# Patient Record
Sex: FEMALE | Race: OTHER | NOT HISPANIC OR LATINO | URBAN - METROPOLITAN AREA
[De-identification: names, ages, dates, MRNs, and addresses within clinical notes are randomized per-mention and may not be internally consistent; named-entity substitution may affect disease eponyms.]

---

## 2019-02-10 ENCOUNTER — INPATIENT (INPATIENT)
Facility: HOSPITAL | Age: 54
LOS: 1 days | Discharge: HOME | End: 2019-02-12
Attending: INTERNAL MEDICINE | Admitting: INTERNAL MEDICINE
Payer: MEDICAID

## 2019-02-10 VITALS
TEMPERATURE: 98 F | RESPIRATION RATE: 20 BRPM | DIASTOLIC BLOOD PRESSURE: 98 MMHG | HEART RATE: 96 BPM | SYSTOLIC BLOOD PRESSURE: 165 MMHG | OXYGEN SATURATION: 100 %

## 2019-02-10 DIAGNOSIS — Z29.9 ENCOUNTER FOR PROPHYLACTIC MEASURES, UNSPECIFIED: ICD-10-CM

## 2019-02-10 DIAGNOSIS — R07.9 CHEST PAIN, UNSPECIFIED: ICD-10-CM

## 2019-02-10 DIAGNOSIS — E78.5 HYPERLIPIDEMIA, UNSPECIFIED: ICD-10-CM

## 2019-02-10 DIAGNOSIS — R55 SYNCOPE AND COLLAPSE: ICD-10-CM

## 2019-02-10 DIAGNOSIS — I10 ESSENTIAL (PRIMARY) HYPERTENSION: ICD-10-CM

## 2019-02-10 LAB
ALBUMIN SERPL ELPH-MCNC: 4.8 G/DL — SIGNIFICANT CHANGE UP (ref 3.3–5)
ALP SERPL-CCNC: 66 U/L — SIGNIFICANT CHANGE UP (ref 40–120)
ALT FLD-CCNC: 31 U/L — SIGNIFICANT CHANGE UP (ref 4–33)
ANION GAP SERPL CALC-SCNC: 14 MMO/L — SIGNIFICANT CHANGE UP (ref 7–14)
APTT BLD: 32.3 SEC — SIGNIFICANT CHANGE UP (ref 27.5–36.3)
AST SERPL-CCNC: 24 U/L — SIGNIFICANT CHANGE UP (ref 4–32)
BASOPHILS # BLD AUTO: 0.02 K/UL — SIGNIFICANT CHANGE UP (ref 0–0.2)
BASOPHILS NFR BLD AUTO: 0.2 % — SIGNIFICANT CHANGE UP (ref 0–2)
BILIRUB SERPL-MCNC: 1 MG/DL — SIGNIFICANT CHANGE UP (ref 0.2–1.2)
BUN SERPL-MCNC: 14 MG/DL — SIGNIFICANT CHANGE UP (ref 7–23)
CALCIUM SERPL-MCNC: 9.7 MG/DL — SIGNIFICANT CHANGE UP (ref 8.4–10.5)
CHLORIDE SERPL-SCNC: 100 MMOL/L — SIGNIFICANT CHANGE UP (ref 98–107)
CHOLEST SERPL-MCNC: 272 MG/DL — HIGH (ref 120–199)
CK MB BLD-MCNC: 1.42 NG/ML — SIGNIFICANT CHANGE UP (ref 1–4.7)
CK MB BLD-MCNC: SIGNIFICANT CHANGE UP (ref 0–2.5)
CK SERPL-CCNC: 72 U/L — SIGNIFICANT CHANGE UP (ref 25–170)
CO2 SERPL-SCNC: 25 MMOL/L — SIGNIFICANT CHANGE UP (ref 22–31)
CREAT SERPL-MCNC: 0.84 MG/DL — SIGNIFICANT CHANGE UP (ref 0.5–1.3)
D DIMER BLD IA.RAPID-MCNC: < 150 NG/ML — SIGNIFICANT CHANGE UP
EOSINOPHIL # BLD AUTO: 0.15 K/UL — SIGNIFICANT CHANGE UP (ref 0–0.5)
EOSINOPHIL NFR BLD AUTO: 1.3 % — SIGNIFICANT CHANGE UP (ref 0–6)
GLUCOSE SERPL-MCNC: 108 MG/DL — HIGH (ref 70–99)
HCT VFR BLD CALC: 48.5 % — HIGH (ref 34.5–45)
HDLC SERPL-MCNC: 62 MG/DL — SIGNIFICANT CHANGE UP (ref 45–65)
HGB BLD-MCNC: 15.1 G/DL — SIGNIFICANT CHANGE UP (ref 11.5–15.5)
IMM GRANULOCYTES NFR BLD AUTO: 0.3 % — SIGNIFICANT CHANGE UP (ref 0–1.5)
INR BLD: 0.94 — SIGNIFICANT CHANGE UP (ref 0.88–1.17)
LIPID PNL WITH DIRECT LDL SERPL: 203 MG/DL — SIGNIFICANT CHANGE UP
LYMPHOCYTES # BLD AUTO: 1.02 K/UL — SIGNIFICANT CHANGE UP (ref 1–3.3)
LYMPHOCYTES # BLD AUTO: 9 % — LOW (ref 13–44)
MCHC RBC-ENTMCNC: 25.7 PG — LOW (ref 27–34)
MCHC RBC-ENTMCNC: 31.1 % — LOW (ref 32–36)
MCV RBC AUTO: 82.6 FL — SIGNIFICANT CHANGE UP (ref 80–100)
MONOCYTES # BLD AUTO: 0.71 K/UL — SIGNIFICANT CHANGE UP (ref 0–0.9)
MONOCYTES NFR BLD AUTO: 6.3 % — SIGNIFICANT CHANGE UP (ref 2–14)
NEUTROPHILS # BLD AUTO: 9.41 K/UL — HIGH (ref 1.8–7.4)
NEUTROPHILS NFR BLD AUTO: 82.9 % — HIGH (ref 43–77)
NRBC # FLD: 0 K/UL — LOW (ref 25–125)
NT-PROBNP SERPL-SCNC: 5 PG/ML — SIGNIFICANT CHANGE UP
PLATELET # BLD AUTO: 308 K/UL — SIGNIFICANT CHANGE UP (ref 150–400)
PMV BLD: 9.5 FL — SIGNIFICANT CHANGE UP (ref 7–13)
POTASSIUM SERPL-MCNC: 4.1 MMOL/L — SIGNIFICANT CHANGE UP (ref 3.5–5.3)
POTASSIUM SERPL-SCNC: 4.1 MMOL/L — SIGNIFICANT CHANGE UP (ref 3.5–5.3)
PROT SERPL-MCNC: 7.8 G/DL — SIGNIFICANT CHANGE UP (ref 6–8.3)
PROTHROM AB SERPL-ACNC: 10.7 SEC — SIGNIFICANT CHANGE UP (ref 9.8–13.1)
RBC # BLD: 5.87 M/UL — HIGH (ref 3.8–5.2)
RBC # FLD: 13.7 % — SIGNIFICANT CHANGE UP (ref 10.3–14.5)
SODIUM SERPL-SCNC: 139 MMOL/L — SIGNIFICANT CHANGE UP (ref 135–145)
TRIGL SERPL-MCNC: 248 MG/DL — HIGH (ref 10–149)
TROPONIN T, HIGH SENSITIVITY: < 6 NG/L — SIGNIFICANT CHANGE UP (ref ?–14)
TROPONIN T, HIGH SENSITIVITY: < 6 NG/L — SIGNIFICANT CHANGE UP (ref ?–14)
WBC # BLD: 11.34 K/UL — HIGH (ref 3.8–10.5)
WBC # FLD AUTO: 11.34 K/UL — HIGH (ref 3.8–10.5)

## 2019-02-10 PROCEDURE — 71046 X-RAY EXAM CHEST 2 VIEWS: CPT | Mod: 26

## 2019-02-10 RX ORDER — ATORVASTATIN CALCIUM 80 MG/1
40 TABLET, FILM COATED ORAL AT BEDTIME
Qty: 0 | Refills: 0 | Status: DISCONTINUED | OUTPATIENT
Start: 2019-02-10 | End: 2019-02-12

## 2019-02-10 RX ORDER — LOSARTAN POTASSIUM 100 MG/1
50 TABLET, FILM COATED ORAL DAILY
Qty: 0 | Refills: 0 | Status: DISCONTINUED | OUTPATIENT
Start: 2019-02-10 | End: 2019-02-12

## 2019-02-10 RX ORDER — IRBESARTAN 75 MG/1
1 TABLET ORAL
Qty: 0 | Refills: 0 | COMMUNITY

## 2019-02-10 RX ORDER — SODIUM CHLORIDE 9 MG/ML
3 INJECTION INTRAMUSCULAR; INTRAVENOUS; SUBCUTANEOUS EVERY 8 HOURS
Qty: 0 | Refills: 0 | Status: DISCONTINUED | OUTPATIENT
Start: 2019-02-10 | End: 2019-02-12

## 2019-02-10 RX ORDER — LOSARTAN POTASSIUM 100 MG/1
50 TABLET, FILM COATED ORAL DAILY
Qty: 0 | Refills: 0 | Status: DISCONTINUED | OUTPATIENT
Start: 2019-02-10 | End: 2019-02-10

## 2019-02-10 RX ORDER — ASPIRIN/CALCIUM CARB/MAGNESIUM 324 MG
1 TABLET ORAL
Qty: 0 | Refills: 0 | COMMUNITY

## 2019-02-10 RX ORDER — ASPIRIN/CALCIUM CARB/MAGNESIUM 324 MG
81 TABLET ORAL DAILY
Qty: 0 | Refills: 0 | Status: DISCONTINUED | OUTPATIENT
Start: 2019-02-11 | End: 2019-02-12

## 2019-02-10 RX ORDER — HEPARIN SODIUM 5000 [USP'U]/ML
5000 INJECTION INTRAVENOUS; SUBCUTANEOUS EVERY 12 HOURS
Qty: 0 | Refills: 0 | Status: DISCONTINUED | OUTPATIENT
Start: 2019-02-10 | End: 2019-02-12

## 2019-02-10 RX ORDER — NITROGLYCERIN 6.5 MG
0.4 CAPSULE, EXTENDED RELEASE ORAL ONCE
Qty: 0 | Refills: 0 | Status: COMPLETED | OUTPATIENT
Start: 2019-02-10 | End: 2019-02-10

## 2019-02-10 RX ADMIN — Medication 0.4 MILLIGRAM(S): at 12:01

## 2019-02-10 RX ADMIN — ATORVASTATIN CALCIUM 40 MILLIGRAM(S): 80 TABLET, FILM COATED ORAL at 22:56

## 2019-02-10 RX ADMIN — SODIUM CHLORIDE 3 MILLILITER(S): 9 INJECTION INTRAMUSCULAR; INTRAVENOUS; SUBCUTANEOUS at 21:31

## 2019-02-10 NOTE — H&P ADULT - NSHPLABSRESULTS_GEN_ALL_CORE
EKG: NSR 93 Flat T in V1, II, AVF , AVL, TWI in V6\                            15.1   11.34 )-----------( 308      ( 10 Feb 2019 11:30 )             48.5     02-10    139  |  100  |  14  ----------------------------<  108<H>  4.1   |  25  |  0.84    Ca    9.7      10 Feb 2019 11:30    TPro  7.8  /  Alb  4.8  /  TBili  1.0  /  DBili  x   /  AST  24  /  ALT  31  /  AlkPhos  66  02-10    Troponin T, High Sensitivity: < 6: ---------------------***PLEASE NOTE***----------------------  Rapid changes upward or downward in high-sensitivity  troponin levels strongly suggest acute myocardial injury.  Hemolysis may falsely lower results. Renal impairment may  increase results.    Normal: <6 - 14 ng/L  Indeterminate: 15 - 51 ng/L  Elevated: >51 ng/L    Please see "[email or web address] on the [employer]  intranet for more information. ng/L (02.10.19 @ 11:30)    < from: Xray Chest 2 Views PA/Lat (02.10.19 @ 12:31) >    Clear lungs.     < end of copied text >

## 2019-02-10 NOTE — ED PROVIDER NOTE - PROGRESS NOTE
blue foam today)  Exercise 5: Gait with rolling walker CGA x 1 with another following in a w/c. ~200 to 250 feet x1. Pt needing cues to keep head up during gait  and to relax shoulders with fair compliance. Activity Tolerance:  Activity Tolerance: Patient Tolerated treatment well    Assessment: Body structures, Functions, Activity limitations: Decreased functional mobility , Decreased ADL status, Decreased ROM, Decreased strength, Decreased safe awareness, Decreased endurance, Decreased balance  Assessment: Pt tolerated treatment session well. Shortened treatment session d/t double booking of patients. Pt amb ~200 to 250 feet x1 with rolling walker with CGA of one person and +1 following with w/c. Prognosis: Fair  Discharge Recommendations: Continue to assess pending progress    Patient Education:  Patient Education: Issued home ex program for UE strengthening with red theraband in seated position. Patient to continue also with seated LE strengthening ex as issued previous session. Plan:  Times per week: 2x per week  Plan weeks: 6 weeks  Specific instructions for Next Treatment: Nustep, LE strengthening seated position LAQS, leg curls with theraband, sit to stand transitions. Balance in // bars and progress in UE/  LE strengthening. Gait training with roller walker with +2 assist.    Current Treatment Recommendations: Strengthening, ROM, Balance Training, Functional Mobility Training, Transfer Training, Home Exercise Program, Gait Training, Endurance Training, Patient/Caregiver Education & Training    Goals:  Patient goals : To be able to walk and stand better. To be less wobbly and improve strength. Short term goals  Time Frame for Short term goals: 3 weeks   Short term goal 1: Patient to demonstrate increased LE strength at hip and knee m to 4/5 with ability to do chair sit to stand test 3x ini 15 seconds with use of UEs on armrests of chair.    Short term goal 2: Patient to demonstrate
Stable.

## 2019-02-10 NOTE — ED PROVIDER NOTE - MEDICAL DECISION MAKING DETAILS
Pt is a 54 y/o F w/ a PMHx of HTN and HLD who p/w left chest pressure and squeezing Pt is a 54 y/o F w/ a PMHx of HTN and HLD who p/w left chest pressure and squeezing concerning for possible unstable angina vs stable angina, low suspicion for PE and dissection and PTX given histroy and physical. Will get CBC, CMP, PT/INR, aPTT, D-Dimmer, trop, proBNP, CXR, give nitro, patient already took ASA, will likely need admission as HEART Score 4 and at moderate risk. Pt is a 54 y/o F w/ a PMHx of HTN and HLD who p/w left chest pressure and squeezing concerning for possible unstable angina vs stable angina, low suspicion for PE and dissection and PTX given histroy and physical. Will get CBC, CMP, PT/INR, aPTT, D-Dimmer, trop, proBNP, CXR, give nitro, patient already took ASA, will likely need CDU due to HEART score of 3.

## 2019-02-10 NOTE — H&P ADULT - ATTENDING COMMENTS
Pt seen and examined at bedside ; agree with above     EKG: SR non specific St-T changes     Echo pending     Assessment and Plan     1) Chest pain on exertion: EKG with non specific changes , HST- , will get echo / Stress in the am     2) Dyslipidemia : states had myopathy with statins in the past LDL>190, monitor on lipitor 40     3) DVT PPX hepqrin sub q

## 2019-02-10 NOTE — ED ADULT NURSE NOTE - CHIEF COMPLAINT QUOTE
states" I am having chest pain with difficulty in breathing since few days getting worst." patient returned from [facility] on feb 4th ." pain gets worst when lying down and going in to back.

## 2019-02-10 NOTE — CONSULT NOTE ADULT - SUBJECTIVE AND OBJECTIVE BOX
TRACI HERNANDEZ  53y  Female      Patient is a 53y old  Female who presents with a chief complaint of chest pain      HPI:  Patient c/o chest pain on and off since last year,substernal,sometimes radiating to lt.arm,used to take asa.Pain used to go away.This tiime chest pain dis not disappear,so decided to come to ER.no n/v,sob,diaphoresis,cough or fever.  chest pain lasted for about 101-5 min.no cp at present  INTERVAL HPI/OVERNIGHT EVENTS:  Medications:MEDICATIONS  (STANDING):  heparin  Injectable 5000 Unit(s) SubCutaneous every 12 hours  losartan 50 milliGRAM(s) Oral daily  sodium chloride 0.9% lock flush 3 milliLiter(s) IV Push every 8 hours  asa  MEDICATIONS  (PRN):      Allergies: Allergies    aspirin (Hives)    Intolerances        REVIEW OF SYSTEMS:  CONSTITUTIONAL: No fever, weight loss, or fatigue  EYES: No eye pain, visual disturbances, or discharge  ENMT:  No difficulty hearing, tinnitus, vertigo; No sinus or throat pain  NECK: No pain or stiffness  RESPIRATORY: No cough, wheezing, chills or hemoptysis; No shortness of breath  CARDIOVASCULAR: No chest pain, palpitations, dizziness, or leg swelling  GASTROINTESTINAL: No abdominal or epigastric pain. No nausea, vomiting, or hematemesis; No diarrhea or constipation. No melena or hematochezia.  GENITOURINARY: No dysuria, frequency, hematuria, or incontinence  NEUROLOGICAL: No headaches, memory loss, loss of strength, numbness, or tremors  SKIN: No itching, burning, rashes, or lesions   ENDOCRINE: No heat or cold intolerance; No hair loss  MUSCULOSKELETAL: No joint pain or swelling; No muscle, back, or extremity pain  PSYCHIATRIC: No depression, anxiety, mood swings, or difficulty sleeping  HEME/LYMPH: No easy bruising, or bleeding gums      T(C): 37 (02-10-19 @ 19:33), Max: 37 (02-10-19 @ 19:33)  HR: 76 (02-10-19 @ 19:33) (76 - 96)  BP: 164/95 (02-10-19 @ 19:33) (161/98 - 174/92)  RR: 17 (02-10-19 @ 19:33) (15 - 20)  SpO2: 100% (02-10-19 @ 19:33) (100% - 100%)  Wt(kg): --Vital Signs Last 24 Hrs  T(C): 37 (10 Feb 2019 19:33), Max: 37 (10 Feb 2019 19:33)  T(F): 98.6 (10 Feb 2019 19:33), Max: 98.6 (10 Feb 2019 19:33)  HR: 76 (10 Feb 2019 19:33) (76 - 96)  BP: 164/95 (10 Feb 2019 19:33) (161/98 - 174/92)  BP(mean): --  RR: 17 (10 Feb 2019 19:33) (15 - 20)  SpO2: 100% (10 Feb 2019 19:33) (100% - 100%)  I&O's Summary      PHYSICAL EXAM:  GENERAL: NAD, well-groomed, well-developed  HEAD:  Atraumatic, Normocephalic  EYES: EOMI, PERRLA, conjunctiva and sclera clear  ENMT: No tonsillar erythema, exudates, or enlargement; Moist mucous membranes, Good dentition, No lesions  NECK: Supple, No JVD, Normal thyroid  NERVOUS SYSTEM:  Alert & Oriented X3, Good concentration; Motor Strength 5/5 B/L upper and lower extremities; DTRs 2+ intact and symmetric  CHEST/LUNG: Clear to percussion bilaterally; No rales, rhonchi, wheezing, or rubs  HEART: Regular rate and rhythm; No murmurs, rubs, or gallops  ABDOMEN: Soft, Nontender, Nondistended; Bowel sounds present  EXTREMITIES:  2+ Peripheral Pulses, No clubbing, cyanosis, or edema  LYMPH: No lymphadenopathy noted  SKIN: No rashes or lesions    Consultant(s) Notes Reviewed:  [x ] YES  [ ] NO  Care Discussed with Consultants/Other Providers [ x] YES  [ ] NO    LABS:                        15.1   11.34 )-----------( 308      ( 10 Feb 2019 11:30 )             48.5     02-10    139  |  100  |  14  ----------------------------<  108<H>  4.1   |  25  |  0.84    Ca    9.7      10 Feb 2019 11:30    TPro  7.8  /  Alb  4.8  /  TBili  1.0  /  DBili  x   /  AST  24  /  ALT  31  /  AlkPhos  66  02-10    PT/INR - ( 10 Feb 2019 11:30 )   PT: 10.7 SEC;   INR: 0.94          PTT - ( 10 Feb 2019 11:30 )  PTT:32.3 SEC    CAPILLARY BLOOD GLUCOSE                RADIOLOGY & ADDITIONAL TESTS:    Imaging Personally Reviewed:  [ ] YES  [ ] NO

## 2019-02-10 NOTE — ED ADULT TRIAGE NOTE - CHIEF COMPLAINT QUOTE
states" I am having chest pain with difficulty in breathing since few days getting worst." patient returned from [facility] on feb 4th ." pain gets worst when lying down and going in to back.

## 2019-02-10 NOTE — ED ADULT NURSE NOTE - OBJECTIVE STATEMENT
52 y/o female presents to ED with multiple medical complaints.  Pt states that she has had chest discomfort for 6 months to a year, pt describes pain as sharp and it extends to her back, pt denies SOB.  Pt states that she travelled from [facility] on 2/4/19 and states that her chest discomfort became acutely worse after her flight.  Pt states that she had an episode of dizziness yesterday.  Pt also states that she has episodes of numbness to her hands that causes her to drop dishes in the sink, pt states that the numbness also occurs in her left leg.  Additionally pt states that she has not slept in over a year because her mouth is dry at night and she needs to drink water.  Pt states that she has not sought medical attention for her symptoms.  Pt states that she has hx of HTN and high cholesterol.  Pt states that she is compliant with her HTN meds but ran out of her cholesterol medication greater than 1 year ago.  IV established labs drawn and sent, pt placed on CM>NSR 90s.  Providers evaluating,  remains at bedside.

## 2019-02-10 NOTE — H&P ADULT - PROBLEM SELECTOR PLAN 1
Admit to tele  check cbc, bmp, a1c, flp, tsh, trend CE  echo ordered   consider ischemic eval  cont asa Admit to tele  check cbc, bmp, a1c, flp, tsh, trend CE  echo ordered   consider ischemic eval  cont asa  Discussed with Dr. Rivera

## 2019-02-10 NOTE — CONSULT NOTE ADULT - ASSESSMENT
-Assessment/Rec  -chest pain-r/o ACS-cont.asa,ECHO,Cardiology consult  -HTN-losartan  -no f/h of cad  -no h/o smoking  -check TSH,lipid profile  -DVT Prophylaxis  -Thanks,will f/u -Assessment/Rec  -chest pain-r/o ACS-cont.asa,ECHO,Cardiology consult  -HTN-losartan  -HLD-star lipitor  -no f/h of cad  -no h/o smoking  -check TSH,lipid profile,hgba1c  -DVT Prophylaxis  -Thanks,will f/u

## 2019-02-10 NOTE — ED PROVIDER NOTE - CADM POA CENTRAL LINE
pt is resting in bed on cardiac monitor. Daughter at bed side. pt denies any pain or discomfort at this time. VS stable. pt and her daughter aware of the plan of care and has no questions or concerns at this time. Safety maintained. Will continue to monitor. No

## 2019-02-10 NOTE — ED PROVIDER NOTE - OBJECTIVE STATEMENT
Pt is a 54 y/o F w/ a PMHx of who p/w chest pain. Pt states that      ROS: Denies fevers, chills, Abdominal pain, rhinorrhea, new rashes, new LE edema, new visual changes, confusion, headaches, blood in stools, N/V, dysuria, and hematuria. Pt is a 52 y/o F w/ a PMHx of HTN and HLD who p/w left chest pressure and squeezing. Pt states that has been on going for the past year and is intermittent associated with SOB, +lying flat makes chest pressure, +presyncope yesterday on exertion, +chest pressure and palpitations on exertion. +flight from [facility] (6hours) recently, no H/o DVT, +FMHx of young death in parents in 30s (pt does not know why), no new MAKSIM, +nausea (Extremely nauseas this morning), no diaphoresis. Took 162 ASA this morning.      ROS: Denies fevers, chills, Abdominal pain, rhinorrhea, new rashes, new visual changes, confusion, headaches, blood in stools, dysuria, and hematuria.    BP med =irbasartan 150mg

## 2019-02-10 NOTE — ED PROVIDER NOTE - ATTENDING CONTRIBUTION TO CARE
nohemi: hx from pt. she does not routinely seek medical care.   hx HTN and elevated cholesterol. Ran out of cholesterol meds 2 months ago. has 6-8 month hx of two different chest pains. one occurring on exertion and relieved at rest; a second which occurs spont at nighttime and lasts until she goes to sleep and not present on awakening.   no known hx CAD. pt has not been to provider for discomfort.   sh: FH CV disease and pt is from [facility].    exam: unremarkable  impression: pt will multiple CV risk factors who does not routinely go for medical care presents with anginal type sx. Pt will be admitted for inpt w/u.

## 2019-02-10 NOTE — ED PROVIDER NOTE - PROGRESS NOTE DETAILS
Spoke to Tele Doc of Day and will admit for R/O ACs. Text paged Tele PA. Rpt EKG in progress.   -Abbe Singletary PGY4 [facility]#[id number]

## 2019-02-10 NOTE — H&P ADULT - HISTORY OF PRESENT ILLNESS
52 y/o [place] F with hx of HTN, HLD presents with chest pain since summer. Patient states she has been having L sided chest pain which radiates to shoulder then b/l arms which lasts for 15-20 minutes. She states she noted the pain both while exerting herself and while resting. Patient has been having intermittent pain over the last few months and she takes aspirin which relieves the pain slightly. She also noted she had three episodes of lightheadedness when she had the same chest pain. She does not have the lightheaded episode all the time while she gets the chest pain, only three times. She states she does not like following up with doctors which was why she never came for the check up. She recently traveled to [facility]. After she returned she had similar pain which was associated with lightheadedness. She tried aspirin, but pain was resolving which was why she came for evaluation. She also notes difficulty lying flat on bed secondary to chest tightness. has chronic B/l LE edema which has not worsen. Also endorses BROCK after 5-10 minutes which has been going for "a while." Denies fever, chills, cough, falls, LOC, nausea, vomiting, melena, hematochezia, dysuria, or calf tenderness.

## 2019-02-11 LAB
ANION GAP SERPL CALC-SCNC: 13 MMO/L — SIGNIFICANT CHANGE UP (ref 7–14)
BASOPHILS # BLD AUTO: 0.03 K/UL — SIGNIFICANT CHANGE UP (ref 0–0.2)
BASOPHILS NFR BLD AUTO: 0.4 % — SIGNIFICANT CHANGE UP (ref 0–2)
BUN SERPL-MCNC: 13 MG/DL — SIGNIFICANT CHANGE UP (ref 7–23)
CALCIUM SERPL-MCNC: 9.2 MG/DL — SIGNIFICANT CHANGE UP (ref 8.4–10.5)
CHLORIDE SERPL-SCNC: 102 MMOL/L — SIGNIFICANT CHANGE UP (ref 98–107)
CHOLEST SERPL-MCNC: 226 MG/DL — HIGH (ref 120–199)
CO2 SERPL-SCNC: 24 MMOL/L — SIGNIFICANT CHANGE UP (ref 22–31)
CREAT SERPL-MCNC: 0.77 MG/DL — SIGNIFICANT CHANGE UP (ref 0.5–1.3)
EOSINOPHIL # BLD AUTO: 0.17 K/UL — SIGNIFICANT CHANGE UP (ref 0–0.5)
EOSINOPHIL NFR BLD AUTO: 2.2 % — SIGNIFICANT CHANGE UP (ref 0–6)
GLUCOSE SERPL-MCNC: 101 MG/DL — HIGH (ref 70–99)
HBA1C BLD-MCNC: 5.7 % — HIGH (ref 4–5.6)
HCG SERPL-ACNC: < 5 MIU/ML — SIGNIFICANT CHANGE UP
HCT VFR BLD CALC: 46.6 % — HIGH (ref 34.5–45)
HCV AB S/CO SERPL IA: 0.17 S/CO — SIGNIFICANT CHANGE UP
HCV AB SERPL-IMP: SIGNIFICANT CHANGE UP
HDLC SERPL-MCNC: 54 MG/DL — SIGNIFICANT CHANGE UP (ref 45–65)
HGB BLD-MCNC: 14.4 G/DL — SIGNIFICANT CHANGE UP (ref 11.5–15.5)
IMM GRANULOCYTES NFR BLD AUTO: 0.4 % — SIGNIFICANT CHANGE UP (ref 0–1.5)
LIPID PNL WITH DIRECT LDL SERPL: 175 MG/DL — SIGNIFICANT CHANGE UP
LYMPHOCYTES # BLD AUTO: 1.15 K/UL — SIGNIFICANT CHANGE UP (ref 1–3.3)
LYMPHOCYTES # BLD AUTO: 14.7 % — SIGNIFICANT CHANGE UP (ref 13–44)
MAGNESIUM SERPL-MCNC: 2 MG/DL — SIGNIFICANT CHANGE UP (ref 1.6–2.6)
MCHC RBC-ENTMCNC: 25.5 PG — LOW (ref 27–34)
MCHC RBC-ENTMCNC: 30.9 % — LOW (ref 32–36)
MCV RBC AUTO: 82.5 FL — SIGNIFICANT CHANGE UP (ref 80–100)
MONOCYTES # BLD AUTO: 1 K/UL — HIGH (ref 0–0.9)
MONOCYTES NFR BLD AUTO: 12.8 % — SIGNIFICANT CHANGE UP (ref 2–14)
NEUTROPHILS # BLD AUTO: 5.46 K/UL — SIGNIFICANT CHANGE UP (ref 1.8–7.4)
NEUTROPHILS NFR BLD AUTO: 69.5 % — SIGNIFICANT CHANGE UP (ref 43–77)
NRBC # FLD: 0 K/UL — LOW (ref 25–125)
PHOSPHATE SERPL-MCNC: 3.6 MG/DL — SIGNIFICANT CHANGE UP (ref 2.5–4.5)
PLATELET # BLD AUTO: 277 K/UL — SIGNIFICANT CHANGE UP (ref 150–400)
PMV BLD: 9.8 FL — SIGNIFICANT CHANGE UP (ref 7–13)
POTASSIUM SERPL-MCNC: 4 MMOL/L — SIGNIFICANT CHANGE UP (ref 3.5–5.3)
POTASSIUM SERPL-SCNC: 4 MMOL/L — SIGNIFICANT CHANGE UP (ref 3.5–5.3)
RBC # BLD: 5.65 M/UL — HIGH (ref 3.8–5.2)
RBC # FLD: 14.1 % — SIGNIFICANT CHANGE UP (ref 10.3–14.5)
SODIUM SERPL-SCNC: 139 MMOL/L — SIGNIFICANT CHANGE UP (ref 135–145)
TRIGL SERPL-MCNC: 145 MG/DL — SIGNIFICANT CHANGE UP (ref 10–149)
TSH SERPL-MCNC: 3.45 UIU/ML — SIGNIFICANT CHANGE UP (ref 0.27–4.2)
WBC # BLD: 7.84 K/UL — SIGNIFICANT CHANGE UP (ref 3.8–10.5)
WBC # FLD AUTO: 7.84 K/UL — SIGNIFICANT CHANGE UP (ref 3.8–10.5)

## 2019-02-11 PROCEDURE — 78452 HT MUSCLE IMAGE SPECT MULT: CPT | Mod: 26

## 2019-02-11 PROCEDURE — 93018 CV STRESS TEST I&R ONLY: CPT | Mod: GC

## 2019-02-11 PROCEDURE — 93016 CV STRESS TEST SUPVJ ONLY: CPT | Mod: GC

## 2019-02-11 PROCEDURE — 93306 TTE W/DOPPLER COMPLETE: CPT | Mod: 26

## 2019-02-11 RX ORDER — ACETAMINOPHEN 500 MG
650 TABLET ORAL ONCE
Qty: 0 | Refills: 0 | Status: COMPLETED | OUTPATIENT
Start: 2019-02-11 | End: 2019-02-11

## 2019-02-11 RX ADMIN — SODIUM CHLORIDE 3 MILLILITER(S): 9 INJECTION INTRAMUSCULAR; INTRAVENOUS; SUBCUTANEOUS at 13:21

## 2019-02-11 RX ADMIN — Medication 650 MILLIGRAM(S): at 13:17

## 2019-02-11 RX ADMIN — Medication 81 MILLIGRAM(S): at 13:19

## 2019-02-11 RX ADMIN — LOSARTAN POTASSIUM 50 MILLIGRAM(S): 100 TABLET, FILM COATED ORAL at 05:01

## 2019-02-11 RX ADMIN — SODIUM CHLORIDE 3 MILLILITER(S): 9 INJECTION INTRAMUSCULAR; INTRAVENOUS; SUBCUTANEOUS at 21:32

## 2019-02-11 RX ADMIN — SODIUM CHLORIDE 3 MILLILITER(S): 9 INJECTION INTRAMUSCULAR; INTRAVENOUS; SUBCUTANEOUS at 05:01

## 2019-02-11 RX ADMIN — Medication 650 MILLIGRAM(S): at 14:10

## 2019-02-11 RX ADMIN — ATORVASTATIN CALCIUM 40 MILLIGRAM(S): 80 TABLET, FILM COATED ORAL at 21:35

## 2019-02-11 NOTE — CHART NOTE - NSCHARTNOTEFT_GEN_A_CORE
Called Dr. Rivera with results of stress test:      Abnormal Study  * Myocardial Perfusion SPECT results are abnormal.  * Review of raw data shows: The study is of good technical  quality.  * The left ventricle was normal in size. There are medium  sized, mild defects in inferior and inferoseptal,  inferolateral walls that are reversible.  * The observed defect(s) partially correct(s) with prone  imaging.  * Post-stress gated wall motion analysis was performed  (LVEF > 70%;LVEDV = 37 ml.), revealing normal LV function.  RV function appeared normal.  * NOTE: Patient experienced chest pain at peak exercise.  Chest pain subsided upon getting off treadmill.    Advised likely Cath on 2/12 or 2/13 schedule permitting. Will keep NPO p MN.

## 2019-02-12 ENCOUNTER — TRANSCRIPTION ENCOUNTER (OUTPATIENT)
Age: 54
End: 2019-02-12

## 2019-02-12 VITALS
TEMPERATURE: 98 F | DIASTOLIC BLOOD PRESSURE: 84 MMHG | OXYGEN SATURATION: 98 % | RESPIRATION RATE: 18 BRPM | HEART RATE: 95 BPM | SYSTOLIC BLOOD PRESSURE: 130 MMHG

## 2019-02-12 LAB
ANION GAP SERPL CALC-SCNC: 14 MMO/L — SIGNIFICANT CHANGE UP (ref 7–14)
BASOPHILS # BLD AUTO: 0.01 K/UL — SIGNIFICANT CHANGE UP (ref 0–0.2)
BASOPHILS NFR BLD AUTO: 0.2 % — SIGNIFICANT CHANGE UP (ref 0–2)
BUN SERPL-MCNC: 11 MG/DL — SIGNIFICANT CHANGE UP (ref 7–23)
CALCIUM SERPL-MCNC: 9.4 MG/DL — SIGNIFICANT CHANGE UP (ref 8.4–10.5)
CHLORIDE SERPL-SCNC: 103 MMOL/L — SIGNIFICANT CHANGE UP (ref 98–107)
CO2 SERPL-SCNC: 25 MMOL/L — SIGNIFICANT CHANGE UP (ref 22–31)
CREAT SERPL-MCNC: 0.84 MG/DL — SIGNIFICANT CHANGE UP (ref 0.5–1.3)
EOSINOPHIL # BLD AUTO: 0.13 K/UL — SIGNIFICANT CHANGE UP (ref 0–0.5)
EOSINOPHIL NFR BLD AUTO: 2.6 % — SIGNIFICANT CHANGE UP (ref 0–6)
GLUCOSE SERPL-MCNC: 98 MG/DL — SIGNIFICANT CHANGE UP (ref 70–99)
HCT VFR BLD CALC: 49 % — HIGH (ref 34.5–45)
HGB BLD-MCNC: 15.2 G/DL — SIGNIFICANT CHANGE UP (ref 11.5–15.5)
IMM GRANULOCYTES NFR BLD AUTO: 0.4 % — SIGNIFICANT CHANGE UP (ref 0–1.5)
LYMPHOCYTES # BLD AUTO: 1.07 K/UL — SIGNIFICANT CHANGE UP (ref 1–3.3)
LYMPHOCYTES # BLD AUTO: 21.5 % — SIGNIFICANT CHANGE UP (ref 13–44)
MCHC RBC-ENTMCNC: 25.6 PG — LOW (ref 27–34)
MCHC RBC-ENTMCNC: 31 % — LOW (ref 32–36)
MCV RBC AUTO: 82.6 FL — SIGNIFICANT CHANGE UP (ref 80–100)
MONOCYTES # BLD AUTO: 0.83 K/UL — SIGNIFICANT CHANGE UP (ref 0–0.9)
MONOCYTES NFR BLD AUTO: 16.7 % — HIGH (ref 2–14)
NEUTROPHILS # BLD AUTO: 2.92 K/UL — SIGNIFICANT CHANGE UP (ref 1.8–7.4)
NEUTROPHILS NFR BLD AUTO: 58.6 % — SIGNIFICANT CHANGE UP (ref 43–77)
NRBC # FLD: 0 K/UL — LOW (ref 25–125)
PLATELET # BLD AUTO: 283 K/UL — SIGNIFICANT CHANGE UP (ref 150–400)
PMV BLD: 9.9 FL — SIGNIFICANT CHANGE UP (ref 7–13)
POTASSIUM SERPL-MCNC: 3.7 MMOL/L — SIGNIFICANT CHANGE UP (ref 3.5–5.3)
POTASSIUM SERPL-SCNC: 3.7 MMOL/L — SIGNIFICANT CHANGE UP (ref 3.5–5.3)
RBC # BLD: 5.93 M/UL — HIGH (ref 3.8–5.2)
RBC # FLD: 14 % — SIGNIFICANT CHANGE UP (ref 10.3–14.5)
SODIUM SERPL-SCNC: 142 MMOL/L — SIGNIFICANT CHANGE UP (ref 135–145)
WBC # BLD: 4.98 K/UL — SIGNIFICANT CHANGE UP (ref 3.8–10.5)
WBC # FLD AUTO: 4.98 K/UL — SIGNIFICANT CHANGE UP (ref 3.8–10.5)

## 2019-02-12 RX ORDER — ACETAMINOPHEN 500 MG
650 TABLET ORAL ONCE
Qty: 0 | Refills: 0 | Status: COMPLETED | OUTPATIENT
Start: 2019-02-12 | End: 2019-02-12

## 2019-02-12 RX ORDER — ATORVASTATIN CALCIUM 80 MG/1
1 TABLET, FILM COATED ORAL
Qty: 30 | Refills: 0 | OUTPATIENT
Start: 2019-02-12 | End: 2019-03-13

## 2019-02-12 RX ADMIN — Medication 650 MILLIGRAM(S): at 17:30

## 2019-02-12 RX ADMIN — LOSARTAN POTASSIUM 50 MILLIGRAM(S): 100 TABLET, FILM COATED ORAL at 05:22

## 2019-02-12 RX ADMIN — Medication 81 MILLIGRAM(S): at 08:17

## 2019-02-12 RX ADMIN — Medication 650 MILLIGRAM(S): at 16:31

## 2019-02-12 RX ADMIN — SODIUM CHLORIDE 3 MILLILITER(S): 9 INJECTION INTRAMUSCULAR; INTRAVENOUS; SUBCUTANEOUS at 05:21

## 2019-02-12 RX ADMIN — SODIUM CHLORIDE 3 MILLILITER(S): 9 INJECTION INTRAMUSCULAR; INTRAVENOUS; SUBCUTANEOUS at 15:15

## 2019-02-12 NOTE — CHART NOTE - NSCHARTNOTEFT_GEN_A_CORE
Patient is s/p LHC via RRA. Patient notes that her right wrist is mildly sore but she is otherwise asymptomatic. Denies headache, dizziness, blurred vision, chest pain, shortness of breath, nausea, vomiting, paresthesias. VSS. Right wrist stable. Dressing clean, dry and intact without bleeding, hematoma, or tenderness. 2+ pulses RUE. Good capillary refill. Case discussed with Dr. Rivera and patient is medically stable for discharge home.

## 2019-02-12 NOTE — DISCHARGE NOTE ADULT - CARE PLAN
Principal Discharge DX:	Chest pain  Goal:	Resolution of symptoms  Assessment and plan of treatment:	You underwent cardiac catheterization which revealed normal coronary arteries. Continue Aspirin, Atorvastatin, and Losartan. Follow up with Dr. Rivera within 1-2 weeks; call [phone] for appointment.  Secondary Diagnosis:	Near syncope  Assessment and plan of treatment:	Drink fluids to avoid dehydration. Follow up with Dr. Rivera within 1-2 weeks; call [phone] for appointment.  Secondary Diagnosis:	Hypertension  Assessment and plan of treatment:	Continue Irbesartan. Low sodium and fat diet. Follow up with Dr. Rivera within 1-2 weeks; call [phone] for appointment.  Secondary Diagnosis:	Hyperlipemia  Assessment and plan of treatment:	Continue Atorvastatin. Low fat diet, exercise daily. Follow up with Dr. Rivera within 1-2 weeks; call [phone] for appointment.

## 2019-02-12 NOTE — DISCHARGE NOTE ADULT - CARE PROVIDER_API CALL
Jesus Rivera (MD)  Cardiovascular Disease; Internal Medicine; Nuclear Cardiology  [address] [place] [address]  Phone: [phone]  Fax: [phone]  Follow Up Time:

## 2019-02-12 NOTE — PROGRESS NOTE ADULT - ATTENDING COMMENTS
1) Chest pain on exertion: EKG with non specific changes , HST- , will get echo / Stress     2) Dyslipidemia : states had myopathy with statins in the past LDL>190, monitor on lipitor 40     3) DVT PPX hepqrin sub q
-stable for d/c  f/u with PCP,Cardiology in 1-2 weeks  d/w family at bedside.

## 2019-02-12 NOTE — DISCHARGE NOTE ADULT - HOSPITAL COURSE
52 y/o F with PMHx of HTN and HLD presented to the ED with L sided chest pain since summer and near syncope, admitted to telemetry. EKG: NSR 93 Flat T in V1, II, AVF, AVL, TWI in V6. hsTrop: negative x 2. Orthostatics negative. Echo with EF 69%, normal LV function, no segmental wall motion abnormality. NST was abnormal showing medium sized, mild defects in inferior and inferoseptal, inferolateral walls that are reversible. Patient had Cardiac cath revealing normal coronary arteries.  Case discussed with Dr. Rivera and patient is medically stable for discharge home with outpatient follow up with him in 1-2 weeks. 52 y/o F with PMHx of HTN and HLD presented to the ED with L sided chest pain since summer and near syncope, admitted to telemetry. EKG: NSR 93 Flat T in V1, II, AVF, AVL, TWI in V6. hsTrop: negative x 2. Orthostatics negative. Echo with EF 69%, normal LV function, no segmental wall motion abnormality. NST was abnormal showing medium sized, mild defects in inferior and inferoseptal, inferolateral walls that are reversible. Patient had Cardiac cath revealing normal coronary arteries. RRA stable post procedure. Patient was started on Atorvastatin for HLD.  Case discussed with Dr. Rivera and patient is medically stable for discharge home with outpatient follow up with him in 1-2 weeks.

## 2019-02-12 NOTE — PROGRESS NOTE ADULT - ASSESSMENT
-Assessment/Rec  -chest pain-r/o ACS-cont.asa,ECHO,Abnormal stress test .cardiology f/u noted.Plan for possible cath.  -HTN-losartan  -HLD-start lipitor-h/o myositis?,monitor  -no f/h of cad  -no h/o smoking  -DVT Prophylaxis  -d/w staff
-Assessment/Rec  -chest pain-atypical-cont.asa,ECHO,Abnormal stress test .cardiology f/u noted..s/p cath-normal coronaries.Medical management.  HTN-losartan  -HLD-start lipitor-h/o myositis?,monitor  -no f/h of cad  -no h/o smoking  -DVT Prophylaxis  -d/w staff

## 2019-02-12 NOTE — PROGRESS NOTE ADULT - SUBJECTIVE AND OBJECTIVE BOX
TRACI HERNANDEZ  53y  Female      Patient is a 53y old  Female who presents with a chief complaint of chest pain (11 Feb 2019 11:13)  comfortable.nad.Above noted.Abnormal stress test.no cp,no sob,no fever,no cough    REVIEW OF SYSTEMS:  as above    INTERVAL HPI/OVERNIGHT EVENTS:  T(C): 36.9 (02-11-19 @ 12:02), Max: 36.9 (02-11-19 @ 12:02)  HR: 92 (02-11-19 @ 12:02) (92 - 92)  BP: 146/86 (02-11-19 @ 12:02) (146/86 - 146/86)  RR: 17 (02-11-19 @ 12:02) (16 - 17)  SpO2: 100% (02-11-19 @ 12:02) (100% - 100%)  Wt(kg): --  I&O's Summary    10 Feb 2019 07:01  -  11 Feb 2019 07:00  --------------------------------------------------------  IN: 200 mL / OUT: 0 mL / NET: 200 mL      T(C): 36.9 (02-11-19 @ 12:02), Max: 36.9 (02-11-19 @ 12:02)  HR: 92 (02-11-19 @ 12:02) (92 - 92)  BP: 146/86 (02-11-19 @ 12:02) (146/86 - 146/86)  RR: 17 (02-11-19 @ 12:02) (16 - 17)  SpO2: 100% (02-11-19 @ 12:02) (100% - 100%)  Wt(kg): --Vital Signs Last 24 Hrs  T(C): 36.9 (11 Feb 2019 12:02), Max: 36.9 (11 Feb 2019 12:02)  T(F): 98.4 (11 Feb 2019 12:02), Max: 98.4 (11 Feb 2019 12:02)  HR: 92 (11 Feb 2019 12:02) (92 - 92)  BP: 146/86 (11 Feb 2019 12:02) (146/86 - 146/86)  BP(mean): --  RR: 17 (11 Feb 2019 12:02) (16 - 17)  SpO2: 100% (11 Feb 2019 12:02) (100% - 100%)    LABS:                        14.4   7.84  )-----------( 277      ( 11 Feb 2019 05:40 )             46.6     02-11    139  |  102  |  13  ----------------------------<  101<H>  4.0   |  24  |  0.77    Ca    9.2      11 Feb 2019 05:40  Phos  3.6     02-11  Mg     2.0     02-11    TPro  7.8  /  Alb  4.8  /  TBili  1.0  /  DBili  x   /  AST  24  /  ALT  31  /  AlkPhos  66  02-10    PT/INR - ( 10 Feb 2019 11:30 )   PT: 10.7 SEC;   INR: 0.94          PTT - ( 10 Feb 2019 11:30 )  PTT:32.3 SEC    CAPILLARY BLOOD GLUCOSE                PAST MEDICAL & SURGICAL HISTORY:  Hypertension  No significant past surgical history      MEDICATIONS  (STANDING):  aspirin enteric coated 81 milliGRAM(s) Oral daily  atorvastatin 40 milliGRAM(s) Oral at bedtime  heparin  Injectable 5000 Unit(s) SubCutaneous every 12 hours  losartan 50 milliGRAM(s) Oral daily  sodium chloride 0.9% lock flush 3 milliLiter(s) IV Push every 8 hours    MEDICATIONS  (PRN):        RADIOLOGY & ADDITIONAL TESTS:    Imaging Personally Reviewed:  [ ] YES  [ ] NO    Consultant(s) Notes Reviewed:  [x ] YES  [ ] NO    PHYSICAL EXAM:  GENERAL: NAD, well-groomed, well-developed  HEAD:  Atraumatic, Normocephalic  EYES: EOMI, PERRLA, conjunctiva and sclera clear  ENMT: No tonsillar erythema, exudates, or enlargement; Moist mucous membranes, Good dentition, No lesions  NECK: Supple, No JVD, Normal thyroid  NERVOUS SYSTEM:  Alert & Oriented X3, Good concentration; Motor Strength 5/5 B/L upper and lower extremities; DTRs 2+ intact and symmetric  CHEST/LUNG: Clear to percussion bilaterally; No rales, rhonchi, wheezing, or rubs  HEART: Regular rate and rhythm; No murmurs, rubs, or gallops  ABDOMEN: Soft, Nontender, Nondistended; Bowel sounds present  EXTREMITIES:  2+ Peripheral Pulses, No clubbing, cyanosis, or edema  LYMPH: No lymphadenopathy noted  SKIN: No rashes or lesions    Care Discussed with Consultants/Other Providers [x ] YES  [ ] NO      Code Status: [] Full Code [] DNR [] DNI [] Goals of Care:   Disposition: [] ICU [] Stroke Unit [] RCU []PCU []Floor [] Discharge Home         YAZAN Ontiveros.FACP
Jesus Rivera MD  Interventional Cardiology / Advance Heart Failure and Cardiac Transplant Specialist  [place] Office : [address] [place] [address]  Tel:   [place] Office : [address] [facility] [address] [address]  Tel: [phone]  Cell : [phone]    Subjective : Pt lying in bed comfortable, not in distress, denies any chest pain or SOB  	  MEDICATIONS:  aspirin enteric coated 81 milliGRAM(s) Oral daily  heparin  Injectable 5000 Unit(s) SubCutaneous every 12 hours  losartan 50 milliGRAM(s) Oral daily  atorvastatin 40 milliGRAM(s) Oral at bedtime  sodium chloride 0.9% lock flush 3 milliLiter(s) IV Push every 8 hours      PHYSICAL EXAM:  T(C): 36.9 (02-11-19 @ 12:02), Max: 37 (02-10-19 @ 19:33)  HR: 92 (02-11-19 @ 12:02) (76 - 92)  BP: 146/86 (02-11-19 @ 12:02) (146/86 - 170/100)  RR: 17 (02-11-19 @ 12:02) (15 - 17)  SpO2: 100% (02-11-19 @ 12:02) (100% - 100%)  Wt(kg): --  I&O's Summary    10 Feb 2019 07:01  -  11 Feb 2019 07:00  --------------------------------------------------------  IN: 200 mL / OUT: 0 mL / NET: 200 mL      Height (cm): 160 (02-10 @ 21:53)  Weight (kg): 63.049 (02-10 @ 21:53)  BMI (kg/m2): 24.6 (02-10 @ 21:53)  BSA (m2): 1.66 (02-10 @ 21:53)    Appearance: Normal	  HEENT:   Normal oral mucosa, PERRL, EOMI	  Cardiovascular: Normal S1 S2, No JVD, No murmurs, No edema  Respiratory: Lungs clear to auscultation	  Gastrointestinal:  Soft, Non-tender, + BS	  Extremities: Normal range of motion, No clubbing, cyanosis or edema        CKMB: 1.42 ng/mL (02-10 @ 22:50)    CKMB Relative Index: Test not performed (02-10 @ 22:50)                            14.4   7.84  )-----------( 277      ( 11 Feb 2019 05:40 )             46.6     02-11    139  |  102  |  13  ----------------------------<  101<H>  4.0   |  24  |  0.77    Ca    9.2      11 Feb 2019 05:40  Phos  3.6     02-11  Mg     2.0     02-11    TPro  7.8  /  Alb  4.8  /  TBili  1.0  /  DBili  x   /  AST  24  /  ALT  31  /  AlkPhos  66  02-10    proBNP:   Lipid Profile:   HgA1c: Hemoglobin A1C, Whole Blood: 5.7 % (02-11 @ 05:40)    TSH: Thyroid Stimulating Hormone, Serum: 3.45 uIU/mL (02-11 @ 05:40)
TRACI HERNANDEZ  53y  Female      Patient is a 53y old  Female who presents with a chief complaint of chest pain (12 Feb 2019 16:05)    Patient feels better.no sob,no cp,no cough.s/p cath-nl coronaries    REVIEW OF SYSTEMS:  neg      INTERVAL HPI/OVERNIGHT EVENTS:  T(C): 36.9 (02-12-19 @ 16:00), Max: 36.9 (02-12-19 @ 16:00)  HR: 95 (02-12-19 @ 16:00) (90 - 95)  BP: 130/84 (02-12-19 @ 16:00) (130/84 - 148/82)  RR: 18 (02-12-19 @ 16:00) (15 - 18)  SpO2: 98% (02-12-19 @ 16:00) (98% - 98%)  Wt(kg): --  I&O's Summary    11 Feb 2019 07:01  -  12 Feb 2019 07:00  --------------------------------------------------------  IN: 120 mL / OUT: 0 mL / NET: 120 mL      T(C): 36.9 (02-12-19 @ 16:00), Max: 36.9 (02-12-19 @ 16:00)  HR: 95 (02-12-19 @ 16:00) (90 - 95)  BP: 130/84 (02-12-19 @ 16:00) (130/84 - 148/82)  RR: 18 (02-12-19 @ 16:00) (15 - 18)  SpO2: 98% (02-12-19 @ 16:00) (98% - 98%)  Wt(kg): --Vital Signs Last 24 Hrs  T(C): 36.9 (12 Feb 2019 16:00), Max: 36.9 (12 Feb 2019 16:00)  T(F): 98.5 (12 Feb 2019 16:00), Max: 98.5 (12 Feb 2019 16:00)  HR: 95 (12 Feb 2019 16:00) (90 - 95)  BP: 130/84 (12 Feb 2019 16:00) (130/84 - 148/82)  BP(mean): --  RR: 18 (12 Feb 2019 16:00) (15 - 18)  SpO2: 98% (12 Feb 2019 16:00) (98% - 98%)    LABS:                        15.2   4.98  )-----------( 283      ( 12 Feb 2019 07:32 )             49.0     02-12    142  |  103  |  11  ----------------------------<  98  3.7   |  25  |  0.84    Ca    9.4      12 Feb 2019 07:32  Phos  3.6     02-11  Mg     2.0     02-11          CAPILLARY BLOOD GLUCOSE                PAST MEDICAL & SURGICAL HISTORY:  Hypertension  No significant past surgical history      MEDICATIONS  (STANDING):  aspirin enteric coated 81 milliGRAM(s) Oral daily  atorvastatin 40 milliGRAM(s) Oral at bedtime  heparin  Injectable 5000 Unit(s) SubCutaneous every 12 hours  losartan 50 milliGRAM(s) Oral daily  sodium chloride 0.9% lock flush 3 milliLiter(s) IV Push every 8 hours    MEDICATIONS  (PRN):        RADIOLOGY & ADDITIONAL TESTS:    Imaging Personally Reviewed:  [ ] YES  [ ] NO    Consultant(s) Notes Reviewed:  [ x] YES  [ ] NO    PHYSICAL EXAM:  GENERAL: NAD, well-groomed, well-developed  HEAD:  Atraumatic, Normocephalic  EYES: EOMI, PERRLA, conjunctiva and sclera clear  ENMT: No tonsillar erythema, exudates, or enlargement; Moist mucous membranes, Good dentition, No lesions  NECK: Supple, No JVD, Normal thyroid  NERVOUS SYSTEM:  Alert & Oriented X3, Good concentration; Motor Strength 5/5 B/L upper and lower extremities; DTRs 2+ intact and symmetric  CHEST/LUNG: Clear to percussion bilaterally; No rales, rhonchi, wheezing, or rubs  HEART: Regular rate and rhythm; No murmurs, rubs, or gallops  ABDOMEN: Soft, Nontender, Nondistended; Bowel sounds present  EXTREMITIES:  2+ Peripheral Pulses, No clubbing, cyanosis, or edema  LYMPH: No lymphadenopathy noted  SKIN: No rashes or lesions    Care Discussed with Consultants/Other Providers [ x] YES  [ ] NO      Code Status: [] Full Code [] DNR [] DNI [] Goals of Care:   Disposition: [] ICU [] Stroke Unit [] RCU []PCU []Floor [] Discharge Home         BENJAMIN Ontiveros

## 2019-02-12 NOTE — DISCHARGE NOTE ADULT - PLAN OF CARE
Resolution of symptoms You underwent cardiac catheterization which revealed normal coronary arteries. Continue Aspirin, Atorvastatin, and Losartan. Follow up with Dr. Rivera within 1-2 weeks; call [phone] for appointment. Drink fluids to avoid dehydration. Follow up with Dr. Rivera within 1-2 weeks; call [phone] for appointment. Continue Irbesartan. Low sodium and fat diet. Follow up with Dr. Rivera within 1-2 weeks; call [phone] for appointment. Continue Atorvastatin. Low fat diet, exercise daily. Follow up with Dr. Rivera within 1-2 weeks; call [phone] for appointment.

## 2019-02-12 NOTE — DISCHARGE NOTE ADULT - PATIENT PORTAL LINK FT
You can access the [facility] Patient Portal, offered by [facility], by registering with the following website: [email or web address]

## 2020-03-25 PROBLEM — I10 ESSENTIAL (PRIMARY) HYPERTENSION: Chronic | Status: ACTIVE | Noted: 2019-02-10

## 2020-03-26 PROBLEM — Z00.00 ENCOUNTER FOR PREVENTIVE HEALTH EXAMINATION: Status: ACTIVE | Noted: 2020-03-26

## 2020-03-27 ENCOUNTER — APPOINTMENT (OUTPATIENT)
Dept: DISASTER EMERGENCY | Facility: CLINIC | Age: 55
End: 2020-03-27

## 2022-01-01 NOTE — ED ADULT NURSE NOTE - PAIN RATING/NUMBER SCALE (0-10): ACTIVITY
Called parents to see how breastfeeding is going.  Parents report mother feeling dennis in breast and they are seeing more in the nipple shield, mother feels like changing to more milk than colostrum.  Infant did need supplement 2 times overnight, but otherwise has been breastfeeding and content with feedings.  They have no questions or concerns today.  Encouraged to call if anything help needed.  
0

## 2022-01-01 NOTE — ED ADULT NURSE NOTE - NS PRO PASSIVE SMOKE EXP
Patient Education       Well Child Exam 1 Week   About this topic   Your baby's 1 week well child exam is a visit with the doctor to check your baby's health. The doctor measures your child's weight, height, and head size. The doctor plots these numbers on a growth curve. The growth curve gives a picture of your baby's growth at each visit. Often your baby will weigh less than their birth weight at this visit. The doctor may listen to your baby's heart, lungs, and belly. The doctor will do a full exam of your baby from the head to the toes.  Your baby may also need shots or blood tests during this visit.  General   Growth and Development   Your doctor will ask you how your baby is developing. The doctor will focus on the skills that most children your child's age are expected to do. During the first week of your child's life, here are some things you can expect.  · Movement ? Your baby may:  ? Hold their arms and legs close to their body.  ? Be able to lift their head up for a short time.  ? Turn their head when you stroke your babys cheek.  ? Hold your finger when it is placed in their palm.  · Hearing and seeing ? Your baby will likely:  ? Turn to the sound of your voice.  ? See best about 8 to 12 inches (20 to 30 cm) away from the face.  ? Want to look at your face or a black and white pattern.  ? Still have their eyes cross or wander from time to time.  · Feeding ? Your baby needs:  ? Breast milk or formula for all of their nutrition. Do not give your baby juice, water, cow's milk, rice cereal, or solid food at this age.  ? To eat every 2 to 3 hours, or 8 to 12 times per day, based on if you are breast or bottle feeding. Look for signs your baby is hungry like:  § Smacking or licking the lips.  § Sucking on fingers, hands, tongue, or lips.  § Opening and closing mouth.  § Turning their head or sucking when you stroke your babys cheek.  § Moving their head from side to side.  ? To be burped often if having  problems with spitting up.  ? Your baby may turn away, close the mouth, or relax the arms when full. Do not overfeed your baby.  ? Always hold your baby when feeding. Do not prop a bottle. Propping the bottle makes it easier for your baby to choke and to get ear infections.     · Diapers ? Your baby:  ? Will have 6 or more wet diapers each day.  ? Will transition from having thick, sticky stools to yellow seedy stools. The number of bowel movements per day can vary; three or four per day is most common.  · Sleep ? Your child:  ? Sleeps for about 2 to 4 hours at a time.  ? Is likely sleeping about 16 to 18 hours total out of each day.  ? May sleep better when swaddled. Monitor your baby when swaddled. Check to make sure your baby has not rolled over. Also, make sure the swaddle blanket has not come loose. Keep the swaddle blanket loose around your baby's hips. Stop swaddling your baby before your baby starts to roll over. Most times, you will need to stop swaddling your baby by 2 months of age.  ? Should always sleep on the back, in your child's own bed, on a firm mattress.  · Crying:  ? Your baby cries to try and tell you something. Your baby may be hot, cold, wet, or hungry. They may also just want to be held. It is good to hold and soothe your baby when they cry. You cannot spoil a baby.  Help for Parents   · Play with your baby.  ? Talk or sing to your baby often. Let your baby look at your face. Show your baby pictures.  ? Gently move your baby's arms and legs. Give your baby a gentle massage.  ? Use tummy time to help your baby grow strong neck muscles. Shake a small rattle to encourage your baby to turn their head to the side.     · Here are some things you can do to help keep your baby safe and healthy.  ? Learn CPR and basic first aid. Learn how to take your baby's temperature.  ? Do not allow anyone to smoke in your home or around your baby. Second hand smoke can harm your baby.  ? Have the right size car  seat for your baby and use it every time your baby is in the car. Your baby should be rear facing until 2 years of age. Check with a local car seat safety inspection station to be sure it is properly installed.  ? Always place your baby on the back for sleep. Keep soft bedding, bumpers, loose blankets, and toys out of your baby's bed.  ? Keep one hand on the baby whenever you are changing their diaper or clothes to prevent falls.  ? Keep small toys and objects away from your baby.  ? Give your baby a sponge bath until their umbilical cord falls off. Never leave your baby alone in the bath.  · Here are some things parents need to think about.  ? Asking for help. Plan for others to help you so you can get some rest. It can be a stressful time after a baby is first born.  ? How to handle bouts of crying or colic. It is normal for your baby to have times when they are hard to console. You need a plan for what to do if you are frustrated because it is never OK to shake a baby.  ? Postpartum depression. Many parents feel sad, tearful, guilty, or overwhelmed within a few days after their baby is born. For mothers, this can be due to her changing hormones. Fathers can have these feelings too though. Talk about your feelings with someone close to you. Try to get enough sleep. Take time to go outside or be with others. If you are having problems with this, talk with your doctor.  · The next well child visit may be when your baby is 2 weeks old. At this visit your doctor may:  ? Do a full check-up on your baby.  ? Talk about how your baby is sleeping, if your baby has colic or long periods of crying, and how well you are coping with your baby.  When do I need to call the doctor?   · Fever of 100.4°F (38°C) or higher.  · Having a hard time breathing.  · Doesnt have a wet diaper for more than 8 hours.  · Problems eating or spits up a lot.  · Legs and arms are very loose or floppy all the time.  · Legs and arms are very  stiff.  · Won't stop crying.  · Doesn't blink or startle with loud sounds.  Where can I learn more?   American Academy of Pediatrics  https://www.healthychildren.org/English/ages-stages/toddler/Pages/Milestones-During-The-First-2-Years.aspx   American Academy of Pediatrics  https://www.healthychildren.org/English/ages-stages/baby/Pages/Hearing-and-Making-Sounds.aspx   Centers for Disease Control and Prevention  https://www.cdc.gov/ncbddd/actearly/milestones/   Department of Health  https://www.vaccines.gov/who_and_when/infants_to_teens/child   Last Reviewed Date   2021-05-06  Consumer Information Use and Disclaimer   This information is not specific medical advice and does not replace information you receive from your health care provider. This is only a brief summary of general information. It does NOT include all information about conditions, illnesses, injuries, tests, procedures, treatments, therapies, discharge instructions or life-style choices that may apply to you. You must talk with your health care provider for complete information about your health and treatment options. This information should not be used to decide whether or not to accept your health care providers advice, instructions or recommendations. Only your health care provider has the knowledge and training to provide advice that is right for you.  Copyright   Copyright © 2021 UpToDate, Inc. and its affiliates and/or licensors. All rights reserved.    Children under the age of 2 years will be restrained in a rear facing child safety seat.   If you have an active [facility] account, please look for your well child questionnaire to come to your [facility] account before your next well child visit.   No

## 2023-09-21 ENCOUNTER — EMERGENCY (EMERGENCY)
Facility: HOSPITAL | Age: 58
LOS: 1 days | Discharge: HOME | End: 2023-09-21
Attending: STUDENT IN AN ORGANIZED HEALTH CARE EDUCATION/TRAINING PROGRAM | Admitting: STUDENT IN AN ORGANIZED HEALTH CARE EDUCATION/TRAINING PROGRAM
Payer: MEDICAID

## 2023-09-21 VITALS
HEART RATE: 66 BPM | RESPIRATION RATE: 16 BRPM | TEMPERATURE: 98 F | SYSTOLIC BLOOD PRESSURE: 156 MMHG | OXYGEN SATURATION: 100 % | DIASTOLIC BLOOD PRESSURE: 86 MMHG

## 2023-09-21 VITALS
TEMPERATURE: 98 F | OXYGEN SATURATION: 100 % | SYSTOLIC BLOOD PRESSURE: 169 MMHG | HEART RATE: 97 BPM | RESPIRATION RATE: 16 BRPM | DIASTOLIC BLOOD PRESSURE: 104 MMHG

## 2023-09-21 LAB
ALBUMIN SERPL ELPH-MCNC: 4.6 G/DL — SIGNIFICANT CHANGE UP (ref 3.3–5)
ALP SERPL-CCNC: 60 U/L — SIGNIFICANT CHANGE UP (ref 40–120)
ALT FLD-CCNC: 20 U/L — SIGNIFICANT CHANGE UP (ref 4–33)
ANION GAP SERPL CALC-SCNC: 11 MMOL/L — SIGNIFICANT CHANGE UP (ref 7–14)
APPEARANCE UR: CLEAR — SIGNIFICANT CHANGE UP
AST SERPL-CCNC: 19 U/L — SIGNIFICANT CHANGE UP (ref 4–32)
BACTERIA # UR AUTO: NEGATIVE /HPF — SIGNIFICANT CHANGE UP
BASOPHILS # BLD AUTO: 0.04 K/UL — SIGNIFICANT CHANGE UP (ref 0–0.2)
BASOPHILS NFR BLD AUTO: 0.4 % — SIGNIFICANT CHANGE UP (ref 0–2)
BILIRUB SERPL-MCNC: 1 MG/DL — SIGNIFICANT CHANGE UP (ref 0.2–1.2)
BILIRUB UR-MCNC: NEGATIVE — SIGNIFICANT CHANGE UP
BUN SERPL-MCNC: 16 MG/DL — SIGNIFICANT CHANGE UP (ref 7–23)
CALCIUM SERPL-MCNC: 9.6 MG/DL — SIGNIFICANT CHANGE UP (ref 8.4–10.5)
CAST: 0 /LPF — SIGNIFICANT CHANGE UP (ref 0–4)
CHLORIDE SERPL-SCNC: 105 MMOL/L — SIGNIFICANT CHANGE UP (ref 98–107)
CO2 SERPL-SCNC: 27 MMOL/L — SIGNIFICANT CHANGE UP (ref 22–31)
COLOR SPEC: YELLOW — SIGNIFICANT CHANGE UP
CREAT SERPL-MCNC: 0.73 MG/DL — SIGNIFICANT CHANGE UP (ref 0.5–1.3)
DIFF PNL FLD: ABNORMAL
EGFR: 94 ML/MIN/1.73M2 — SIGNIFICANT CHANGE UP
EOSINOPHIL # BLD AUTO: 0.06 K/UL — SIGNIFICANT CHANGE UP (ref 0–0.5)
EOSINOPHIL NFR BLD AUTO: 0.6 % — SIGNIFICANT CHANGE UP (ref 0–6)
GLUCOSE SERPL-MCNC: 84 MG/DL — SIGNIFICANT CHANGE UP (ref 70–99)
GLUCOSE UR QL: NEGATIVE MG/DL — SIGNIFICANT CHANGE UP
HCT VFR BLD CALC: 44.9 % — SIGNIFICANT CHANGE UP (ref 34.5–45)
HGB BLD-MCNC: 14.2 G/DL — SIGNIFICANT CHANGE UP (ref 11.5–15.5)
IANC: 6.95 K/UL — SIGNIFICANT CHANGE UP (ref 1.8–7.4)
IMM GRANULOCYTES NFR BLD AUTO: 0.3 % — SIGNIFICANT CHANGE UP (ref 0–0.9)
KETONES UR-MCNC: NEGATIVE MG/DL — SIGNIFICANT CHANGE UP
LEUKOCYTE ESTERASE UR-ACNC: ABNORMAL
LYMPHOCYTES # BLD AUTO: 1.72 K/UL — SIGNIFICANT CHANGE UP (ref 1–3.3)
LYMPHOCYTES # BLD AUTO: 17.9 % — SIGNIFICANT CHANGE UP (ref 13–44)
MAGNESIUM SERPL-MCNC: 2.1 MG/DL — SIGNIFICANT CHANGE UP (ref 1.6–2.6)
MCHC RBC-ENTMCNC: 26.2 PG — LOW (ref 27–34)
MCHC RBC-ENTMCNC: 31.6 GM/DL — LOW (ref 32–36)
MCV RBC AUTO: 82.7 FL — SIGNIFICANT CHANGE UP (ref 80–100)
MONOCYTES # BLD AUTO: 0.83 K/UL — SIGNIFICANT CHANGE UP (ref 0–0.9)
MONOCYTES NFR BLD AUTO: 8.6 % — SIGNIFICANT CHANGE UP (ref 2–14)
NEUTROPHILS # BLD AUTO: 6.95 K/UL — SIGNIFICANT CHANGE UP (ref 1.8–7.4)
NEUTROPHILS NFR BLD AUTO: 72.2 % — SIGNIFICANT CHANGE UP (ref 43–77)
NITRITE UR-MCNC: NEGATIVE — SIGNIFICANT CHANGE UP
NRBC # BLD: 0 /100 WBCS — SIGNIFICANT CHANGE UP (ref 0–0)
NRBC # FLD: 0 K/UL — SIGNIFICANT CHANGE UP (ref 0–0)
PH UR: 7 — SIGNIFICANT CHANGE UP (ref 5–8)
PLATELET # BLD AUTO: 295 K/UL — SIGNIFICANT CHANGE UP (ref 150–400)
POTASSIUM SERPL-MCNC: 4.3 MMOL/L — SIGNIFICANT CHANGE UP (ref 3.5–5.3)
POTASSIUM SERPL-SCNC: 4.3 MMOL/L — SIGNIFICANT CHANGE UP (ref 3.5–5.3)
PROT SERPL-MCNC: 7.7 G/DL — SIGNIFICANT CHANGE UP (ref 6–8.3)
PROT UR-MCNC: NEGATIVE MG/DL — SIGNIFICANT CHANGE UP
RBC # BLD: 5.43 M/UL — HIGH (ref 3.8–5.2)
RBC # FLD: 13.9 % — SIGNIFICANT CHANGE UP (ref 10.3–14.5)
RBC CASTS # UR COMP ASSIST: 0 /HPF — SIGNIFICANT CHANGE UP (ref 0–4)
REVIEW: SIGNIFICANT CHANGE UP
SODIUM SERPL-SCNC: 143 MMOL/L — SIGNIFICANT CHANGE UP (ref 135–145)
SP GR SPEC: 1.01 — SIGNIFICANT CHANGE UP (ref 1–1.03)
SQUAMOUS # UR AUTO: 0 /HPF — SIGNIFICANT CHANGE UP (ref 0–5)
UROBILINOGEN FLD QL: 0.2 MG/DL — SIGNIFICANT CHANGE UP (ref 0.2–1)
WBC # BLD: 9.63 K/UL — SIGNIFICANT CHANGE UP (ref 3.8–10.5)
WBC # FLD AUTO: 9.63 K/UL — SIGNIFICANT CHANGE UP (ref 3.8–10.5)
WBC UR QL: 98 /HPF — HIGH (ref 0–5)

## 2023-09-21 PROCEDURE — 74177 CT ABD & PELVIS W/CONTRAST: CPT | Mod: 26,MA

## 2023-09-21 PROCEDURE — 99285 EMERGENCY DEPT VISIT HI MDM: CPT

## 2023-09-21 RX ORDER — CEFTRIAXONE 500 MG/1
1000 INJECTION, POWDER, FOR SOLUTION INTRAMUSCULAR; INTRAVENOUS ONCE
Refills: 0 | Status: DISCONTINUED | OUTPATIENT
Start: 2023-09-21 | End: 2023-09-21

## 2023-09-21 RX ORDER — SODIUM CHLORIDE 9 MG/ML
1000 INJECTION INTRAMUSCULAR; INTRAVENOUS; SUBCUTANEOUS ONCE
Refills: 0 | Status: COMPLETED | OUTPATIENT
Start: 2023-09-21 | End: 2023-09-21

## 2023-09-21 RX ORDER — ACETAMINOPHEN 500 MG
1000 TABLET ORAL ONCE
Refills: 0 | Status: COMPLETED | OUTPATIENT
Start: 2023-09-21 | End: 2023-09-21

## 2023-09-21 RX ORDER — DIPHENHYDRAMINE HCL 50 MG
25 CAPSULE ORAL ONCE
Refills: 0 | Status: COMPLETED | OUTPATIENT
Start: 2023-09-21 | End: 2023-09-21

## 2023-09-21 RX ORDER — CEFPODOXIME PROXETIL 100 MG
1 TABLET ORAL
Qty: 10 | Refills: 0
Start: 2023-09-21 | End: 2023-09-25

## 2023-09-21 RX ORDER — KETOROLAC TROMETHAMINE 30 MG/ML
15 SYRINGE (ML) INJECTION ONCE
Refills: 0 | Status: DISCONTINUED | OUTPATIENT
Start: 2023-09-21 | End: 2023-09-21

## 2023-09-21 RX ORDER — CEFPODOXIME PROXETIL 100 MG
200 TABLET ORAL ONCE
Refills: 0 | Status: COMPLETED | OUTPATIENT
Start: 2023-09-21 | End: 2023-09-21

## 2023-09-21 RX ORDER — ACETAMINOPHEN 500 MG
1000 TABLET ORAL ONCE
Refills: 0 | Status: DISCONTINUED | OUTPATIENT
Start: 2023-09-21 | End: 2023-09-21

## 2023-09-21 RX ADMIN — Medication 400 MILLIGRAM(S): at 17:43

## 2023-09-21 RX ADMIN — Medication 15 MILLIGRAM(S): at 17:43

## 2023-09-21 RX ADMIN — Medication 200 MILLIGRAM(S): at 20:24

## 2023-09-21 RX ADMIN — SODIUM CHLORIDE 1000 MILLILITER(S): 9 INJECTION INTRAMUSCULAR; INTRAVENOUS; SUBCUTANEOUS at 17:38

## 2023-09-21 RX ADMIN — Medication 25 MILLIGRAM(S): at 17:38

## 2023-09-21 NOTE — ED ADULT TRIAGE NOTE - CHIEF COMPLAINT QUOTE
pt c/o 3 days worsening pelvic pain/ lower back pain, fever, chills, pain on urination. hx of uti, htl , hld

## 2023-09-21 NOTE — ED ADULT NURSE REASSESSMENT NOTE - NS ED NURSE REASSESS COMMENT FT1
Rapid response called to CT scan, MD Sims and Amos called to bedside, pt had reaction after receiving IV contrast at CT scan. Pt c/o tremors, dry mouth, lightheadedness. Noted to be tremulus. Given IV Benadryl and fluids per MD orders. BGL 88. Airway patent, RR even and unlabored. Pt placed back to the hallway in intake for cardiac and further monitoring.

## 2023-09-21 NOTE — ED ADULT NURSE REASSESSMENT NOTE - NS ED NURSE REASSESS COMMENT FT1
Patient received from CT s/p possible reaction to IV contrast. Respirations even and unlabored. Oxygen saturation WNL. Able to speak full complete sentences without difficulty. Airway open patent and unobstructed. Patient noted to be shaking. States " I feel better" No signs of acute distress noted. Placed on cardiac monitor. Medicated as per MD orders. Comfort and safety maintained. All current care needs met. Care plan continued Stew RAHMAN

## 2023-09-21 NOTE — ED PROVIDER NOTE - ATTENDING CONTRIBUTION TO CARE
58-year-old female history of hypertension, uterine prolapse, multiple UTIs, presents with 2 days of suprapubic and lower pelvic pain, dysuria. Pt with 5 UTI's in the past year, reports this feels the same but symptoms more severe this time. Saw her Gyn who told her that she could have surgery (hysterectomy?) which could help with her symptoms. Plan for labs, UA.

## 2023-09-21 NOTE — ED PROVIDER NOTE - PHYSICAL EXAMINATION
GEN: NAD. A&Ox3. Non-toxic appearing.  HEENT: normocephalic, atraumatic, EOMI, PERRL, no scleral icterus, no conjuntival pallor, moist MM  CARDIAC: RRR, S1, S2, no murmur. Radial pulses and DP pulses present and symmetric b/l  PULM: CTA B/L no wheeze, rhonchi, rales.   ABD: soft NT, ND, no rebound no guarding, suprapubic ttp, b/l lower pelvic ttp, no CVA tenderness.   MSK: Moving all extremities, no edema. 5/5 strength and full ROM in all extremities.     NEURO: no focal neurological deficits, CN 2-12 grossly intact  SKIN: warm, dry, no rash.

## 2023-09-21 NOTE — ED ADULT NURSE NOTE - OBJECTIVE STATEMENT
Patient received in stretcher. AOX4. Respirations even and unlabored. Spontaneous movement of all extremities noted. Presents to ER c/o " I think I have a UTI" Patient reports she was diagnosed with a UTI in august finished course of AX but s/s didn't "fully resolve" + urinary frequency +urgency + pain + burning. Patient reports taking on 1000mg of Tylenol with minimal relief. IV placed. Labs drawn Comfort and safety maintained. All current care needs met. Care plan continued Stew RAHMAN

## 2023-09-21 NOTE — ED PROVIDER NOTE - NSFOLLOWUPINSTRUCTIONS_ED_ALL_ED_FT
You were seen in the emergency department for lower pelvic pain.  Your lab and imaging results are attached.  The prescription was sent for antibiotics.  Please take as prescribed.    Please follow-up with GYN, contact information is attached.    Please return the emergency department for new or worsening symptoms.

## 2023-09-21 NOTE — ED PROVIDER NOTE - PROGRESS NOTE DETAILS
Patient with tremors and lightheadedness after receiving contrast.  Also states having dry mouth.  Lungs clear to auscultation, fingerstick unremarkable.  Patient given 25 of Benadryl and fluids.  Patient now improvement in symptoms.  Positive for UTI.  Will give cefpodoxime here and prescription.  To be discharged.  -Ozzy Trinidad PGY-2

## 2023-09-21 NOTE — ED PROVIDER NOTE - PATIENT PORTAL LINK FT
You can access the FollowMyHealth Patient Portal offered by [facility] by registering at the following website: [email or web address]. By joining [employer]’s FollowMyHealth portal, you will also be able to view your health information using other applications (apps) compatible with our system.

## 2023-09-21 NOTE — ED PROVIDER NOTE - NSFOLLOWUPCLINICS_GEN_ALL_ED_FT
[facility]  OB/GYN & Surg  [address] [place] [address]  Phone: [phone]  Fax:     [facility] Gynecology and Obstetrics  Gynceology/OB  [address] [place] [address]  Phone: [phone]  Fax:

## 2023-09-21 NOTE — ED ADULT NURSE NOTE - SUICIDE SCREENING QUESTION 2
HIPAA verified. Notified patient of lab results per DR Ash's note. Patient verbalized understanding and thanked for call. No

## 2023-09-21 NOTE — ED PROVIDER NOTE - CLINICAL SUMMARY MEDICAL DECISION MAKING FREE TEXT BOX
58-year-old female history of hypertension, uterine prolapse, multiple UTIs, presents with 2 days of suprapubic and lower pelvic pain, dysuria.  Patient took old Macrobid today.  Discussed with her GYN 1 month ago with a discussion of surgery, does not have follow-up yet.   endorses chills, denies chest pain, shortness of breath, nausea, vomiting, abdominal pain.  Afebrile, suprapubic and lower pelvic ttp, no CVA tenderness. Likely recurrent UTI 2/2 uterine prolapse. Will obtain CT to eval for malignancy. Labs, pain, control, imaging, reassess. Will need GYN f/u.

## 2023-09-22 LAB
CULTURE RESULTS: SIGNIFICANT CHANGE UP
SPECIMEN SOURCE: SIGNIFICANT CHANGE UP

## 2024-10-09 ENCOUNTER — EMERGENCY (EMERGENCY)
Facility: HOSPITAL | Age: 59
LOS: 1 days | Discharge: HOME | End: 2024-10-09
Attending: EMERGENCY MEDICINE | Admitting: EMERGENCY MEDICINE
Payer: MEDICAID

## 2024-10-09 VITALS
DIASTOLIC BLOOD PRESSURE: 65 MMHG | SYSTOLIC BLOOD PRESSURE: 140 MMHG | RESPIRATION RATE: 16 BRPM | HEART RATE: 66 BPM | OXYGEN SATURATION: 100 %

## 2024-10-09 VITALS
DIASTOLIC BLOOD PRESSURE: 77 MMHG | WEIGHT: 136.91 LBS | RESPIRATION RATE: 18 BRPM | OXYGEN SATURATION: 99 % | HEART RATE: 78 BPM | TEMPERATURE: 98 F | SYSTOLIC BLOOD PRESSURE: 185 MMHG

## 2024-10-09 LAB
ALBUMIN SERPL ELPH-MCNC: 4.4 G/DL — SIGNIFICANT CHANGE UP (ref 3.3–5)
ALP SERPL-CCNC: 57 U/L — SIGNIFICANT CHANGE UP (ref 40–120)
ALT FLD-CCNC: 37 U/L — HIGH (ref 4–33)
ANION GAP SERPL CALC-SCNC: 14 MMOL/L — SIGNIFICANT CHANGE UP (ref 7–14)
AST SERPL-CCNC: 35 U/L — HIGH (ref 4–32)
BASOPHILS # BLD AUTO: 0.02 K/UL — SIGNIFICANT CHANGE UP (ref 0–0.2)
BASOPHILS NFR BLD AUTO: 0.3 % — SIGNIFICANT CHANGE UP (ref 0–2)
BILIRUB SERPL-MCNC: 1.2 MG/DL — SIGNIFICANT CHANGE UP (ref 0.2–1.2)
BUN SERPL-MCNC: 15 MG/DL — SIGNIFICANT CHANGE UP (ref 7–23)
CALCIUM SERPL-MCNC: 9.8 MG/DL — SIGNIFICANT CHANGE UP (ref 8.4–10.5)
CHLORIDE SERPL-SCNC: 99 MMOL/L — SIGNIFICANT CHANGE UP (ref 98–107)
CK SERPL-CCNC: 157 U/L — SIGNIFICANT CHANGE UP (ref 25–170)
CO2 SERPL-SCNC: 28 MMOL/L — SIGNIFICANT CHANGE UP (ref 22–31)
CREAT SERPL-MCNC: 0.8 MG/DL — SIGNIFICANT CHANGE UP (ref 0.5–1.3)
EGFR: 84 ML/MIN/1.73M2 — SIGNIFICANT CHANGE UP
EOSINOPHIL # BLD AUTO: 0.09 K/UL — SIGNIFICANT CHANGE UP (ref 0–0.5)
EOSINOPHIL NFR BLD AUTO: 1.4 % — SIGNIFICANT CHANGE UP (ref 0–6)
FLUAV AG NPH QL: SIGNIFICANT CHANGE UP
FLUBV AG NPH QL: SIGNIFICANT CHANGE UP
GLUCOSE SERPL-MCNC: 96 MG/DL — SIGNIFICANT CHANGE UP (ref 70–99)
HCT VFR BLD CALC: 44.8 % — SIGNIFICANT CHANGE UP (ref 34.5–45)
HGB BLD-MCNC: 14.5 G/DL — SIGNIFICANT CHANGE UP (ref 11.5–15.5)
IANC: 4.07 K/UL — SIGNIFICANT CHANGE UP (ref 1.8–7.4)
IMM GRANULOCYTES NFR BLD AUTO: 0.2 % — SIGNIFICANT CHANGE UP (ref 0–0.9)
LYMPHOCYTES # BLD AUTO: 1.29 K/UL — SIGNIFICANT CHANGE UP (ref 1–3.3)
LYMPHOCYTES # BLD AUTO: 20.8 % — SIGNIFICANT CHANGE UP (ref 13–44)
MAGNESIUM SERPL-MCNC: 2 MG/DL — SIGNIFICANT CHANGE UP (ref 1.6–2.6)
MCHC RBC-ENTMCNC: 25.9 PG — LOW (ref 27–34)
MCHC RBC-ENTMCNC: 32.4 GM/DL — SIGNIFICANT CHANGE UP (ref 32–36)
MCV RBC AUTO: 80.1 FL — SIGNIFICANT CHANGE UP (ref 80–100)
MONOCYTES # BLD AUTO: 0.73 K/UL — SIGNIFICANT CHANGE UP (ref 0–0.9)
MONOCYTES NFR BLD AUTO: 11.8 % — SIGNIFICANT CHANGE UP (ref 2–14)
NEUTROPHILS # BLD AUTO: 4.07 K/UL — SIGNIFICANT CHANGE UP (ref 1.8–7.4)
NEUTROPHILS NFR BLD AUTO: 65.5 % — SIGNIFICANT CHANGE UP (ref 43–77)
NRBC # BLD: 0 /100 WBCS — SIGNIFICANT CHANGE UP (ref 0–0)
NRBC # FLD: 0 K/UL — SIGNIFICANT CHANGE UP (ref 0–0)
PLATELET # BLD AUTO: 281 K/UL — SIGNIFICANT CHANGE UP (ref 150–400)
POTASSIUM SERPL-MCNC: 3.4 MMOL/L — LOW (ref 3.5–5.3)
POTASSIUM SERPL-SCNC: 3.4 MMOL/L — LOW (ref 3.5–5.3)
PROT SERPL-MCNC: 7.6 G/DL — SIGNIFICANT CHANGE UP (ref 6–8.3)
RBC # BLD: 5.59 M/UL — HIGH (ref 3.8–5.2)
RBC # FLD: 12.9 % — SIGNIFICANT CHANGE UP (ref 10.3–14.5)
RSV RNA NPH QL NAA+NON-PROBE: SIGNIFICANT CHANGE UP
SARS-COV-2 RNA SPEC QL NAA+PROBE: SIGNIFICANT CHANGE UP
SODIUM SERPL-SCNC: 141 MMOL/L — SIGNIFICANT CHANGE UP (ref 135–145)
TROPONIN T, HIGH SENSITIVITY RESULT: <6 NG/L — SIGNIFICANT CHANGE UP
TSH SERPL-MCNC: 1.74 UIU/ML — SIGNIFICANT CHANGE UP (ref 0.27–4.2)
WBC # BLD: 6.21 K/UL — SIGNIFICANT CHANGE UP (ref 3.8–10.5)
WBC # FLD AUTO: 6.21 K/UL — SIGNIFICANT CHANGE UP (ref 3.8–10.5)

## 2024-10-09 PROCEDURE — 71045 X-RAY EXAM CHEST 1 VIEW: CPT | Mod: 26

## 2024-10-09 PROCEDURE — 99285 EMERGENCY DEPT VISIT HI MDM: CPT

## 2024-10-09 PROCEDURE — 93010 ELECTROCARDIOGRAM REPORT: CPT

## 2024-10-09 RX ORDER — DIPHENHYDRAMINE HCL 12.5MG/5ML
50 LIQUID (ML) ORAL ONCE
Refills: 0 | Status: COMPLETED | OUTPATIENT
Start: 2024-10-09 | End: 2024-10-09

## 2024-10-09 RX ORDER — SODIUM CHLORIDE 0.9 % (FLUSH) 0.9 %
1000 SYRINGE (ML) INJECTION ONCE
Refills: 0 | Status: COMPLETED | OUTPATIENT
Start: 2024-10-09 | End: 2024-10-09

## 2024-10-09 RX ORDER — METHYLPREDNISOLONE ACETATE 80 MG/ML
125 INJECTION, SUSPENSION INTRA-ARTICULAR; INTRALESIONAL; INTRAMUSCULAR; SOFT TISSUE ONCE
Refills: 0 | Status: COMPLETED | OUTPATIENT
Start: 2024-10-09 | End: 2024-10-09

## 2024-10-09 RX ADMIN — METHYLPREDNISOLONE ACETATE 125 MILLIGRAM(S): 80 INJECTION, SUSPENSION INTRA-ARTICULAR; INTRALESIONAL; INTRAMUSCULAR; SOFT TISSUE at 12:24

## 2024-10-09 RX ADMIN — Medication 1000 MILLILITER(S): at 12:28

## 2024-10-09 RX ADMIN — Medication 50 MILLIGRAM(S): at 12:19

## 2024-10-09 NOTE — ED PROVIDER NOTE - PATIENT PORTAL LINK FT
You can access the FollowMyHealth Patient Portal offered by [facility] by registering at the following website: [email or web address]. By joining [employer]’s FollowMyHealth portal, you will also be able to view your health information using other applications (apps) compatible with our system.

## 2024-10-09 NOTE — ED PROVIDER NOTE - PROGRESS NOTE DETAILS
Patient symptomatically improved after IV fluids, benadryl. Pruritus resolved. EKG nsr and troponin negative. CXR clear. Likely minor allergic reaction. Will discharge patient with PMD f/u and  on home care instructions and return precautions.

## 2024-10-09 NOTE — ED ADULT TRIAGE NOTE - CHIEF COMPLAINT QUOTE
Pt presents to ED ambulatory from home with c/o headache burning like sensation x 2 weeks. Pt reports shortness of breath started last night. Pt endorses hx of HTN and HLD.

## 2024-10-09 NOTE — ED PROVIDER NOTE - ATTENDING CONTRIBUTION TO CARE
I performed a face-to-face evaluation of the patient and performed a history and physical examination. I agree with the history and physical examination. If this was a PA visit, I personally saw the patient with the PA and performed a substantive portion of the visit including all aspects of the medical decision making.    ~1 wk feeling hot/cold flashes and itching. Also, L jaw pain, CP, SOB. Not likely PNA: no infectious Sx (eg, purulent cough). Not likely PE or other VTE: low PERC or Wells score, EKG no e/o R-heart strain. Started statin recently. Consider hyperthyroid, statin myopathy, ACS. Eval for ACS: check trop, EKG, CXR. Check CK and TSH. I independently interpreted the EKG. My interpretation of the EKG: No hyper-acute T waves, no malignant dysrhythmia, no ischemic ST segment changes. Give allergy meds.

## 2024-10-09 NOTE — ED PROVIDER NOTE - NSFOLLOWUPINSTRUCTIONS_ED_ALL_ED_FT
Please follow-up with your primary doctor. Continue to take allergy medication as needed for itching. If you develop difficulty breathing, vomiting, worsening rash or chest pain, return to the ED for evaluation.

## 2024-10-09 NOTE — ED ADULT NURSE NOTE - OBJECTIVE STATEMENT
Pt arrives to intake. Pt is A and Ox4 and ambulatory. HX: HTN,HLD. Pt arrives to the ED with multiple medical complaints. Pt arrives to the ED complaining of AMS, "head hotness", and weakness for 2 weeks. No stroke code called, out of window. Pt states that she finds herself walking into the kitchen instead of the bathroom and is more lethargic than usual. Per family member at bedside pt is not altered at this time, but goes in and out of confusion. Pt appears anxious. Airway is patent, respirations are even and unlabored. IV placed in the RAC. Pt medicated per MAR. Plan of care ongoing, safety maintained.

## 2024-10-09 NOTE — ED ADULT NURSE NOTE - NSFALLRISKINTERV_ED_ALL_ED

## 2024-10-09 NOTE — ED PROVIDER NOTE - CLINICAL SUMMARY MEDICAL DECISION MAKING FREE TEXT BOX
Florentin: ~1 wk feeling hot/cold flashes and itching. Also, L jaw pain, CP, SOB. Not likely PNA: no infectious Sx (eg, purulent cough). Not likely PE or other VTE: low PERC or Wells score, EKG no e/o R-heart strain. Started statin recently. Consider hyperthyroid, statin myopathy, ACS. Eval for ACS: check trop, EKG, CXR. Check CK and TSH. I independently interpreted the EKG. My interpretation of the EKG: No hyper-acute T waves, no malignant dysrhythmia, no ischemic ST segment changes. Give allergy meds. Florentin: ~1 wk feeling hot/cold flashes and itching. Also, L jaw pain, CP, SOB. Not likely PNA: no infectious Sx (eg, purulent cough). Not likely PE or other VTE: low PERC or Wells score, EKG no e/o R-heart strain. Started statin recently. Consider hyperthyroid, statin myopathy, ACS. Eval for ACS: check trop, EKG, CXR. Check CK and TSH. I independently interpreted the EKG. My interpretation of the EKG: No hyper-acute T waves, no malignant dysrhythmia, no ischemic ST segment changes. Give allergy meds.    Kuhner: 60 y/o F p/w hot/cold sensations, pruritus on face and L side of chest intermittently x1 week. Patient is hemodynamically stable, afebrile, non-toxic appearing on exam. No rash, lungs CTAB, airway intact without uvular or tonsillar swelling. No concern for anaphylaxis. Possible allergic reaction. Will r/o ACS given age, risk factors, and h/o L sided chest pain. Will r/o other electrolyte and metabolic abnormalities. Will obtain basic labs, EKG, CXR, give allergy meds and reassess.

## 2024-10-09 NOTE — ED PROVIDER NOTE - OBJECTIVE STATEMENT
60 y/o F with PMH HTN, HLD p/w pruritis, L sided chest pain, facial rash intermittently over the last week. Patient states over the past 6 days she has felt hot in her face and in the L side of her head, neck, and chest. She endorses mild headache associated with symptoms and an itchy sensation all over her body. Endorses mild throat tightness but no difficulty breathing, abd pain, nvd. No fevers, chills, syncope, rash, sick contacts, recent travel. Has prior allergy to contrast dye and aspirin. Only other new exposure is recently starting rosuvastatin one week ago. No other changes to diet, soaps, detergents, environmental exposures.

## 2024-10-09 NOTE — ED PROVIDER NOTE - PHYSICAL EXAMINATION
Physical Exam  GEN: Alert and oriented x 3, in no acute distress, speaking full clear sentences  HEENT: NC/AT, PERRL, EOMI, normal oropharynx  NECK: Supple, nontender, FROM  CV: RRR, no m/r/g  PULM: CTA bilat, no wheezing/rales/rhonchi  ABD: Soft, nontender, nondistended. No organomegaly  EXTR: FROM to all extremities, nontender, no edema  SKIN: Warm, dry, no rash  NEURO: AOx3, speaking full clear sentences, CN II-XII intact, b/l upper and lower extremity strength and sensation intact and equal, ambulatory with steady gait.

## 2025-08-18 ENCOUNTER — EMERGENCY (EMERGENCY)
Facility: HOSPITAL | Age: 60
LOS: 1 days | End: 2025-08-18
Attending: EMERGENCY MEDICINE | Admitting: STUDENT IN AN ORGANIZED HEALTH CARE EDUCATION/TRAINING PROGRAM
Payer: MEDICAID

## 2025-08-18 VITALS
OXYGEN SATURATION: 98 % | TEMPERATURE: 98 F | WEIGHT: 149.03 LBS | HEART RATE: 89 BPM | SYSTOLIC BLOOD PRESSURE: 160 MMHG | HEIGHT: 63 IN | DIASTOLIC BLOOD PRESSURE: 85 MMHG | RESPIRATION RATE: 18 BRPM

## 2025-08-18 LAB
ADD ON TEST-SPECIMEN IN LAB: SIGNIFICANT CHANGE UP
ALBUMIN SERPL ELPH-MCNC: 4.1 G/DL — SIGNIFICANT CHANGE UP (ref 3.3–5)
ALP SERPL-CCNC: 67 U/L — SIGNIFICANT CHANGE UP (ref 40–120)
ALT FLD-CCNC: 27 U/L — SIGNIFICANT CHANGE UP (ref 4–33)
ANION GAP SERPL CALC-SCNC: 12 MMOL/L — SIGNIFICANT CHANGE UP (ref 7–14)
AST SERPL-CCNC: 22 U/L — SIGNIFICANT CHANGE UP (ref 4–32)
BASOPHILS # BLD AUTO: 0.04 K/UL — SIGNIFICANT CHANGE UP (ref 0–0.2)
BASOPHILS NFR BLD AUTO: 0.4 % — SIGNIFICANT CHANGE UP (ref 0–2)
BILIRUB SERPL-MCNC: 1.3 MG/DL — HIGH (ref 0.2–1.2)
BUN SERPL-MCNC: 16 MG/DL — SIGNIFICANT CHANGE UP (ref 7–23)
CALCIUM SERPL-MCNC: 9.4 MG/DL — SIGNIFICANT CHANGE UP (ref 8.4–10.5)
CHLORIDE SERPL-SCNC: 104 MMOL/L — SIGNIFICANT CHANGE UP (ref 98–107)
CK SERPL-CCNC: 116 U/L — SIGNIFICANT CHANGE UP (ref 25–170)
CO2 SERPL-SCNC: 24 MMOL/L — SIGNIFICANT CHANGE UP (ref 22–31)
CREAT SERPL-MCNC: 0.79 MG/DL — SIGNIFICANT CHANGE UP (ref 0.5–1.3)
CRP SERPL-MCNC: 11.5 MG/L — HIGH
EGFR: 85 ML/MIN/1.73M2 — SIGNIFICANT CHANGE UP
EGFR: 85 ML/MIN/1.73M2 — SIGNIFICANT CHANGE UP
EOSINOPHIL # BLD AUTO: 0.14 K/UL — SIGNIFICANT CHANGE UP (ref 0–0.5)
EOSINOPHIL NFR BLD AUTO: 1.3 % — SIGNIFICANT CHANGE UP (ref 0–6)
GLUCOSE SERPL-MCNC: 105 MG/DL — HIGH (ref 70–99)
HCT VFR BLD CALC: 42.3 % — SIGNIFICANT CHANGE UP (ref 34.5–45)
HGB BLD-MCNC: 13.8 G/DL — SIGNIFICANT CHANGE UP (ref 11.5–15.5)
IMM GRANULOCYTES # BLD AUTO: 0.05 K/UL — SIGNIFICANT CHANGE UP (ref 0–0.07)
IMM GRANULOCYTES NFR BLD AUTO: 0.5 % — SIGNIFICANT CHANGE UP (ref 0–0.9)
LYMPHOCYTES # BLD AUTO: 1.81 K/UL — SIGNIFICANT CHANGE UP (ref 1–3.3)
LYMPHOCYTES NFR BLD AUTO: 16.3 % — SIGNIFICANT CHANGE UP (ref 13–44)
MCHC RBC-ENTMCNC: 26.3 PG — LOW (ref 27–34)
MCHC RBC-ENTMCNC: 32.6 G/DL — SIGNIFICANT CHANGE UP (ref 32–36)
MCV RBC AUTO: 80.7 FL — SIGNIFICANT CHANGE UP (ref 80–100)
MONOCYTES # BLD AUTO: 1.33 K/UL — HIGH (ref 0–0.9)
MONOCYTES NFR BLD AUTO: 12 % — SIGNIFICANT CHANGE UP (ref 2–14)
NEUTROPHILS # BLD AUTO: 7.74 K/UL — HIGH (ref 1.8–7.4)
NEUTROPHILS NFR BLD AUTO: 69.5 % — SIGNIFICANT CHANGE UP (ref 43–77)
NRBC # BLD AUTO: 0 K/UL — SIGNIFICANT CHANGE UP (ref 0–0)
NRBC # FLD: 0 K/UL — SIGNIFICANT CHANGE UP (ref 0–0)
NRBC BLD AUTO-RTO: 0 /100 WBCS — SIGNIFICANT CHANGE UP (ref 0–0)
PLATELET # BLD AUTO: 279 K/UL — SIGNIFICANT CHANGE UP (ref 150–400)
PMV BLD: 9.8 FL — SIGNIFICANT CHANGE UP (ref 7–13)
POTASSIUM SERPL-MCNC: 3.6 MMOL/L — SIGNIFICANT CHANGE UP (ref 3.5–5.3)
POTASSIUM SERPL-SCNC: 3.6 MMOL/L — SIGNIFICANT CHANGE UP (ref 3.5–5.3)
PROT SERPL-MCNC: 7.5 G/DL — SIGNIFICANT CHANGE UP (ref 6–8.3)
RBC # BLD: 5.24 M/UL — HIGH (ref 3.8–5.2)
RBC # FLD: 13.7 % — SIGNIFICANT CHANGE UP (ref 10.3–14.5)
SODIUM SERPL-SCNC: 140 MMOL/L — SIGNIFICANT CHANGE UP (ref 135–145)
WBC # BLD: 11.11 K/UL — HIGH (ref 3.8–10.5)
WBC # FLD AUTO: 11.11 K/UL — HIGH (ref 3.8–10.5)

## 2025-08-18 PROCEDURE — 76705 ECHO EXAM OF ABDOMEN: CPT | Mod: 26

## 2025-08-18 PROCEDURE — 99223 1ST HOSP IP/OBS HIGH 75: CPT

## 2025-08-18 RX ORDER — ACETAMINOPHEN 500 MG/5ML
975 LIQUID (ML) ORAL EVERY 6 HOURS
Refills: 0 | Status: ACTIVE | OUTPATIENT
Start: 2025-08-18 | End: 2026-07-17

## 2025-08-18 RX ORDER — CLINDAMYCIN PHOSPHATE 150 MG/ML
600 VIAL (ML) INJECTION EVERY 8 HOURS
Refills: 0 | Status: ACTIVE | OUTPATIENT
Start: 2025-08-18 | End: 2026-07-17

## 2025-08-18 RX ORDER — CLINDAMYCIN PHOSPHATE 150 MG/ML
600 VIAL (ML) INJECTION ONCE
Refills: 0 | Status: COMPLETED | OUTPATIENT
Start: 2025-08-18 | End: 2025-08-18

## 2025-08-18 RX ORDER — SODIUM CHLORIDE 9 G/1000ML
1000 INJECTION, SOLUTION INTRAVENOUS ONCE
Refills: 0 | Status: COMPLETED | OUTPATIENT
Start: 2025-08-18 | End: 2025-08-18

## 2025-08-18 RX ORDER — LOSARTAN POTASSIUM 100 MG/1
100 TABLET, FILM COATED ORAL DAILY
Refills: 0 | Status: ACTIVE | OUTPATIENT
Start: 2025-08-18 | End: 2026-07-17

## 2025-08-18 RX ORDER — OXYCODONE HYDROCHLORIDE 30 MG/1
5 TABLET ORAL EVERY 6 HOURS
Refills: 0 | Status: DISCONTINUED | OUTPATIENT
Start: 2025-08-18 | End: 2025-08-18

## 2025-08-18 RX ORDER — ROSUVASTATIN CALCIUM 20 MG/1
40 TABLET, FILM COATED ORAL AT BEDTIME
Refills: 0 | Status: ACTIVE | OUTPATIENT
Start: 2025-08-18 | End: 2026-07-17

## 2025-08-18 RX ORDER — ACETAMINOPHEN 500 MG/5ML
1000 LIQUID (ML) ORAL ONCE
Refills: 0 | Status: COMPLETED | OUTPATIENT
Start: 2025-08-18 | End: 2025-08-18

## 2025-08-18 RX ADMIN — SODIUM CHLORIDE 1000 MILLILITER(S): 9 INJECTION, SOLUTION INTRAVENOUS at 10:13

## 2025-08-18 RX ADMIN — Medication 400 MILLIGRAM(S): at 10:14

## 2025-08-18 RX ADMIN — Medication 100 MILLIGRAM(S): at 17:20

## 2025-08-18 RX ADMIN — Medication 1000 MILLIGRAM(S): at 10:54

## 2025-08-18 RX ADMIN — Medication 100 MILLIGRAM(S): at 10:47

## 2025-08-18 RX ADMIN — ROSUVASTATIN CALCIUM 40 MILLIGRAM(S): 20 TABLET, FILM COATED ORAL at 22:28

## 2025-08-18 RX ADMIN — Medication 4 MILLIGRAM(S): at 10:13

## 2025-08-18 RX ADMIN — Medication 4 MILLIGRAM(S): at 10:54

## 2025-08-18 RX ADMIN — Medication 2 MILLIGRAM(S): at 15:15

## 2025-08-18 RX ADMIN — Medication 2 MILLIGRAM(S): at 14:29

## 2025-08-19 VITALS
SYSTOLIC BLOOD PRESSURE: 129 MMHG | OXYGEN SATURATION: 96 % | HEART RATE: 80 BPM | TEMPERATURE: 97 F | DIASTOLIC BLOOD PRESSURE: 83 MMHG | RESPIRATION RATE: 16 BRPM

## 2025-08-19 PROCEDURE — 99239 HOSP IP/OBS DSCHRG MGMT >30: CPT

## 2025-08-19 RX ORDER — CLINDAMYCIN PHOSPHATE 150 MG/ML
3 VIAL (ML) INJECTION
Qty: 90 | Refills: 0
Start: 2025-08-19 | End: 2025-08-28

## 2025-08-19 RX ADMIN — Medication 100 MILLIGRAM(S): at 01:38

## 2025-08-19 RX ADMIN — Medication 100 MILLIGRAM(S): at 10:43

## 2025-08-19 RX ADMIN — LOSARTAN POTASSIUM 100 MILLIGRAM(S): 100 TABLET, FILM COATED ORAL at 05:54

## 2025-08-24 LAB
-  AMOXICILLIN/CLAVULANIC ACID: SIGNIFICANT CHANGE UP
-  AMPICILLIN/SULBACTAM: SIGNIFICANT CHANGE UP
-  AMPICILLIN: SIGNIFICANT CHANGE UP
-  AZTREONAM: SIGNIFICANT CHANGE UP
-  CEFAZOLIN: SIGNIFICANT CHANGE UP
-  CEFEPIME: SIGNIFICANT CHANGE UP
-  CEFOXITIN: SIGNIFICANT CHANGE UP
-  CEFTRIAXONE: SIGNIFICANT CHANGE UP
-  CIPROFLOXACIN: SIGNIFICANT CHANGE UP
-  CLINDAMYCIN: SIGNIFICANT CHANGE UP
-  DAPTOMYCIN: SIGNIFICANT CHANGE UP
-  ERTAPENEM: SIGNIFICANT CHANGE UP
-  ERYTHROMYCIN: SIGNIFICANT CHANGE UP
-  GENTAMICIN: SIGNIFICANT CHANGE UP
-  GENTAMICIN: SIGNIFICANT CHANGE UP
-  IMIPENEM: SIGNIFICANT CHANGE UP
-  LEVOFLOXACIN: SIGNIFICANT CHANGE UP
-  LINEZOLID: SIGNIFICANT CHANGE UP
-  MEROPENEM: SIGNIFICANT CHANGE UP
-  OXACILLIN: SIGNIFICANT CHANGE UP
-  PENICILLIN: SIGNIFICANT CHANGE UP
-  PIPERACILLIN/TAZOBACTAM: SIGNIFICANT CHANGE UP
-  RIFAMPIN: SIGNIFICANT CHANGE UP
-  TETRACYCLINE: SIGNIFICANT CHANGE UP
-  TIGECYCLINE: SIGNIFICANT CHANGE UP
-  TOBRAMYCIN: SIGNIFICANT CHANGE UP
-  TRIMETHOPRIM/SULFAMETHOXAZOLE: SIGNIFICANT CHANGE UP
-  TRIMETHOPRIM/SULFAMETHOXAZOLE: SIGNIFICANT CHANGE UP
-  VANCOMYCIN: SIGNIFICANT CHANGE UP
CULTURE RESULTS: ABNORMAL
METHOD TYPE: SIGNIFICANT CHANGE UP
METHOD TYPE: SIGNIFICANT CHANGE UP
ORGANISM # SPEC MICROSCOPIC CNT: ABNORMAL
SPECIMEN SOURCE: SIGNIFICANT CHANGE UP

## 2025-08-24 RX ORDER — SULFAMETHOXAZOLE/TRIMETHOPRIM 800-160 MG
1 TABLET ORAL
Qty: 20 | Refills: 0
Start: 2025-08-24 | End: 2025-09-02